# Patient Record
Sex: FEMALE | Race: WHITE | Employment: UNEMPLOYED
[De-identification: names, ages, dates, MRNs, and addresses within clinical notes are randomized per-mention and may not be internally consistent; named-entity substitution may affect disease eponyms.]

---

## 2017-05-27 ENCOUNTER — HEALTH MAINTENANCE LETTER (OUTPATIENT)
Age: 27
End: 2017-05-27

## 2019-09-28 ENCOUNTER — HEALTH MAINTENANCE LETTER (OUTPATIENT)
Age: 29
End: 2019-09-28

## 2021-01-10 ENCOUNTER — HEALTH MAINTENANCE LETTER (OUTPATIENT)
Age: 31
End: 2021-01-10

## 2021-10-23 ENCOUNTER — HEALTH MAINTENANCE LETTER (OUTPATIENT)
Age: 31
End: 2021-10-23

## 2022-02-12 ENCOUNTER — HEALTH MAINTENANCE LETTER (OUTPATIENT)
Age: 32
End: 2022-02-12

## 2022-10-10 ENCOUNTER — HEALTH MAINTENANCE LETTER (OUTPATIENT)
Age: 32
End: 2022-10-10

## 2023-02-18 ENCOUNTER — HEALTH MAINTENANCE LETTER (OUTPATIENT)
Age: 33
End: 2023-02-18

## 2024-03-16 ENCOUNTER — HEALTH MAINTENANCE LETTER (OUTPATIENT)
Age: 34
End: 2024-03-16

## 2024-11-19 ENCOUNTER — APPOINTMENT (OUTPATIENT)
Dept: URBAN - METROPOLITAN AREA CLINIC 256 | Age: 34
Setting detail: DERMATOLOGY
End: 2024-11-19

## 2024-11-19 VITALS — WEIGHT: 190 LBS | HEIGHT: 70 IN

## 2024-11-19 DIAGNOSIS — L71.0 PERIORAL DERMATITIS: ICD-10-CM

## 2024-11-19 DIAGNOSIS — D49.2 NEOPLASM OF UNSPECIFIED BEHAVIOR OF BONE, SOFT TISSUE, AND SKIN: ICD-10-CM

## 2024-11-19 DIAGNOSIS — L57.8 OTHER SKIN CHANGES DUE TO CHRONIC EXPOSURE TO NONIONIZING RADIATION: ICD-10-CM

## 2024-11-19 DIAGNOSIS — D22 MELANOCYTIC NEVI: ICD-10-CM

## 2024-11-19 DIAGNOSIS — Z71.89 OTHER SPECIFIED COUNSELING: ICD-10-CM

## 2024-11-19 PROBLEM — D22.5 MELANOCYTIC NEVI OF TRUNK: Status: ACTIVE | Noted: 2024-11-19

## 2024-11-19 PROCEDURE — OTHER MIPS QUALITY: OTHER

## 2024-11-19 PROCEDURE — OTHER PATIENT SPECIFIC COUNSELING: OTHER

## 2024-11-19 PROCEDURE — OTHER BIOPSY BY SHAVE METHOD: OTHER

## 2024-11-19 PROCEDURE — OTHER PRESCRIPTION: OTHER

## 2024-11-19 PROCEDURE — 99204 OFFICE O/P NEW MOD 45 MIN: CPT | Mod: 25

## 2024-11-19 PROCEDURE — OTHER PHOTO-DOCUMENTATION: OTHER

## 2024-11-19 PROCEDURE — OTHER COUNSELING: OTHER

## 2024-11-19 PROCEDURE — 11102 TANGNTL BX SKIN SINGLE LES: CPT

## 2024-11-19 PROCEDURE — OTHER SUNSCREEN RECOMMENDATIONS: OTHER

## 2024-11-19 PROCEDURE — OTHER PRESCRIPTION MEDICATION MANAGEMENT: OTHER

## 2024-11-19 RX ORDER — MINOCYCLINE HYDROCHLORIDE 100 MG/1
CAPSULE ORAL QD
Qty: 30 | Refills: 0 | Status: ERX | COMMUNITY
Start: 2024-11-19

## 2024-11-19 ASSESSMENT — LOCATION SIMPLE DESCRIPTION DERM
LOCATION SIMPLE: ABDOMEN
LOCATION SIMPLE: UPPER BACK

## 2024-11-19 ASSESSMENT — LOCATION ZONE DERM: LOCATION ZONE: TRUNK

## 2024-11-19 ASSESSMENT — LOCATION DETAILED DESCRIPTION DERM
LOCATION DETAILED: SUPERIOR THORACIC SPINE
LOCATION DETAILED: LEFT LATERAL ABDOMEN

## 2024-11-19 NOTE — PROCEDURE: PRESCRIPTION MEDICATION MANAGEMENT
Render In Strict Bullet Format?: No
Plan: RTC in 1 month to recheck symptoms, sooner with concerns.
Detail Level: Zone
Initiate Treatment: Take one minocycline 100 mg capsule twice daily by mouth with food for 1 month.

## 2024-11-19 NOTE — HPI: FULL BODY SKIN EXAMINATION
What Is The Reason For Today's Visit?: Full Body Skin Examination
What Is The Reason For Today's Visit? (Being Monitored For X): the development of new lesions
Additional History: Left abdomen, tender, raised, discolored, 4 months

## 2024-11-19 NOTE — PROCEDURE: PATIENT SPECIFIC COUNSELING
Detail Level: Zone
- Informed patient symptoms are consistent with Perioral dermatitis \\n- Recommended increase in minocycline dosage instead of lower dosage prescribed by PCP; risks and benefits reviewed\\n- Patient decided to proceed with 100mg minocycline as treatment\\n- Advised patient to RTC in 1 month to recheck symptoms, sooner with concerns\\n- Patient expressed understanding and was agreeable

## 2024-11-19 NOTE — PROCEDURE: BIOPSY BY SHAVE METHOD
Detail Level: Detailed
Depth Of Biopsy: dermis
Was A Bandage Applied: Yes
Size Of Lesion In Cm: 0.8
X Size Of Lesion In Cm: 0
Biopsy Type: H and E
Biopsy Method: Personna blade
Anesthesia Type: 1% Xylocaine with 1:200,000 epinephrine and sodium bicarbonate
Anesthesia Volume In Cc: 0.5
Hemostasis: Drysol
Wound Care: Petrolatum
Dressing: bandage
Destruction After The Procedure: No
Type Of Destruction Used: Curettage
Curettage Text: The wound bed was treated with curettage after the biopsy was performed.
Cryotherapy Text: The wound bed was treated with cryotherapy after the biopsy was performed.
Electrodesiccation Text: The wound bed was treated with electrodesiccation after the biopsy was performed.
Electrodesiccation And Curettage Text: The wound bed was treated with electrodesiccation and curettage after the biopsy was performed.
Silver Nitrate Text: The wound bed was treated with silver nitrate after the biopsy was performed.
Lab: -[id number]
Path Notes (To The Dermatopathologist): Please check margins.
Consent: Written consent was obtained and risks were reviewed including but not limited to scarring, infection, bleeding, scabbing, incomplete removal, nerve damage and allergy to anesthesia.
Post-Care Instructions: I reviewed with the patient in detail post-care instructions. Patient is to keep the biopsy site dry overnight, and then apply bacitracin twice daily until healed.
Notification Instructions: Patient will be notified of biopsy results. However, patient instructed to call the office if not contacted within 2 weeks.
Billing Type: Third-Party Bill
Information: Selecting Yes will display possible errors in your note based on the variables you have selected. This validation is only offered as a suggestion for you. PLEASE NOTE THAT THE VALIDATION TEXT WILL BE REMOVED WHEN YOU FINALIZE YOUR NOTE. IF YOU WANT TO FAX A PRELIMINARY NOTE YOU WILL NEED TO TOGGLE THIS TO 'NO' IF YOU DO NOT WANT IT IN YOUR FAXED NOTE.

## 2024-12-09 ENCOUNTER — APPOINTMENT (OUTPATIENT)
Dept: URBAN - METROPOLITAN AREA CLINIC 256 | Age: 34
Setting detail: DERMATOLOGY
End: 2024-12-09

## 2024-12-09 VITALS — WEIGHT: 200 LBS | HEIGHT: 70 IN

## 2024-12-09 DIAGNOSIS — D22 MELANOCYTIC NEVI: ICD-10-CM

## 2024-12-09 PROBLEM — D22.5 MELANOCYTIC NEVI OF TRUNK: Status: ACTIVE | Noted: 2024-12-09

## 2024-12-09 PROCEDURE — 11402 EXC TR-EXT B9+MARG 1.1-2 CM: CPT

## 2024-12-09 PROCEDURE — OTHER COUNSELING: OTHER

## 2024-12-09 PROCEDURE — OTHER MIPS QUALITY: OTHER

## 2024-12-09 PROCEDURE — OTHER EXCISION: OTHER

## 2024-12-09 PROCEDURE — 12032 INTMD RPR S/A/T/EXT 2.6-7.5: CPT

## 2024-12-09 ASSESSMENT — LOCATION ZONE DERM: LOCATION ZONE: TRUNK

## 2024-12-09 ASSESSMENT — LOCATION SIMPLE DESCRIPTION DERM: LOCATION SIMPLE: ABDOMEN

## 2024-12-09 ASSESSMENT — LOCATION DETAILED DESCRIPTION DERM: LOCATION DETAILED: PERIUMBILICAL SKIN

## 2024-12-09 NOTE — PROCEDURE: EXCISION

## 2024-12-16 ENCOUNTER — RX ONLY (RX ONLY)
Age: 34
End: 2024-12-16

## 2024-12-16 RX ORDER — MINOCYCLINE HYDROCHLORIDE 100 MG/1
CAPSULE ORAL QD
Qty: 60 | Refills: 0 | Status: ERX

## 2025-01-23 ENCOUNTER — TELEPHONE (OUTPATIENT)
Dept: FAMILY MEDICINE | Facility: CLINIC | Age: 35
End: 2025-01-23
Payer: COMMERCIAL

## 2025-01-23 NOTE — TELEPHONE ENCOUNTER
"[facility] phone call message- general phone call:    Reason for call: Pt request injection teaching\" visit:  would you please call to schedule  nurse visit    Action Desired:     Return call needed: Yes    OK to leave a message on voice mail? Yes    Advised patient response may take up to 2 business days: Yes    Primary language: English      needed? No    Call taken on January 23, 2025 at 12:40 PM by JOSE CARLOS Crabtree    Route to [facility] TRIAGE    "

## 2025-01-24 NOTE — TELEPHONE ENCOUNTER
Patient has been injecting for quite awhile, they are having issues with insurance and prior authorization at this time, will discuss the need for injection teaching with patient and provider    Carolyn Resendiz RN

## 2025-01-27 ENCOUNTER — TELEPHONE (OUTPATIENT)
Dept: FAMILY MEDICINE | Facility: CLINIC | Age: 35
End: 2025-01-27

## 2025-01-27 ENCOUNTER — OFFICE VISIT (OUTPATIENT)
Dept: FAMILY MEDICINE | Facility: CLINIC | Age: 35
End: 2025-01-27
Payer: COMMERCIAL

## 2025-01-27 VITALS
DIASTOLIC BLOOD PRESSURE: 78 MMHG | HEART RATE: 60 BPM | SYSTOLIC BLOOD PRESSURE: 121 MMHG | BODY MASS INDEX: 27.62 KG/M2 | HEIGHT: 69 IN | OXYGEN SATURATION: 98 % | TEMPERATURE: 98.6 F | RESPIRATION RATE: 16 BRPM

## 2025-01-27 DIAGNOSIS — F31.81 BIPOLAR 2 DISORDER (H): ICD-10-CM

## 2025-01-27 DIAGNOSIS — Z11.4 SCREENING FOR HIV (HUMAN IMMUNODEFICIENCY VIRUS): Primary | ICD-10-CM

## 2025-01-27 DIAGNOSIS — F64.9 GENDER DYSPHORIA: Primary | ICD-10-CM

## 2025-01-27 DIAGNOSIS — Z12.4 CERVICAL CANCER SCREENING: ICD-10-CM

## 2025-01-27 DIAGNOSIS — K21.9 GASTROESOPHAGEAL REFLUX DISEASE WITHOUT ESOPHAGITIS: ICD-10-CM

## 2025-01-27 DIAGNOSIS — F41.9 ANXIETY: ICD-10-CM

## 2025-01-27 DIAGNOSIS — L71.0 PERIORAL DERMATITIS: ICD-10-CM

## 2025-01-27 DIAGNOSIS — G43.009 MIGRAINE WITHOUT AURA AND WITHOUT STATUS MIGRAINOSUS, NOT INTRACTABLE: ICD-10-CM

## 2025-01-27 DIAGNOSIS — F64.9 GENDER DYSPHORIA: ICD-10-CM

## 2025-01-27 DIAGNOSIS — Z11.59 NEED FOR HEPATITIS C SCREENING TEST: ICD-10-CM

## 2025-01-27 LAB — ESTRADIOL SERPL-MCNC: 597 PG/ML

## 2025-01-27 PROCEDURE — 36415 COLL VENOUS BLD VENIPUNCTURE: CPT | Performed by: FAMILY MEDICINE

## 2025-01-27 PROCEDURE — 82670 ASSAY OF TOTAL ESTRADIOL: CPT | Mod: KX | Performed by: FAMILY MEDICINE

## 2025-01-27 PROCEDURE — 99214 OFFICE O/P EST MOD 30 MIN: CPT | Performed by: FAMILY MEDICINE

## 2025-01-27 PROCEDURE — 84403 ASSAY OF TOTAL TESTOSTERONE: CPT | Mod: KX | Performed by: FAMILY MEDICINE

## 2025-01-27 RX ORDER — PROPRANOLOL HYDROCHLORIDE 10 MG/1
1 TABLET ORAL DAILY PRN
COMMUNITY

## 2025-01-27 RX ORDER — ESTRADIOL VALERATE 20 MG/ML
INJECTION INTRAMUSCULAR
Qty: 15 ML | Refills: 1 | Status: SHIPPED | OUTPATIENT
Start: 2025-01-27

## 2025-01-27 RX ORDER — SUMATRIPTAN 50 MG/1
50 TABLET, FILM COATED ORAL
Qty: 12 TABLET | Refills: 1 | Status: SHIPPED | OUTPATIENT
Start: 2025-01-27

## 2025-01-27 RX ORDER — ESTRADIOL VALERATE 20 MG/ML
INJECTION INTRAMUSCULAR WEEKLY
Qty: 15 ML | Refills: 1 | Status: SHIPPED | OUTPATIENT
Start: 2025-01-27 | End: 2025-01-27

## 2025-01-27 RX ORDER — MINOCYCLINE HYDROCHLORIDE 100 MG/1
100 CAPSULE ORAL 2 TIMES DAILY
Qty: 180 CAPSULE | Refills: 1 | Status: SHIPPED | OUTPATIENT
Start: 2025-01-27

## 2025-01-27 RX ORDER — LAMOTRIGINE 300 MG/1
300 TABLET, EXTENDED RELEASE ORAL DAILY
Qty: 90 TABLET | Refills: 1 | Status: SHIPPED | OUTPATIENT
Start: 2025-01-27

## 2025-01-27 RX ORDER — SPIRONOLACTONE 25 MG/1
25 TABLET ORAL 3 TIMES DAILY
Qty: 270 TABLET | Refills: 1 | Status: SHIPPED | OUTPATIENT
Start: 2025-01-27

## 2025-01-27 RX ORDER — BUSPIRONE HYDROCHLORIDE 10 MG/1
10 TABLET ORAL 2 TIMES DAILY
Qty: 180 TABLET | Refills: 1 | Status: SHIPPED | OUTPATIENT
Start: 2025-01-27

## 2025-01-27 NOTE — TELEPHONE ENCOUNTER
Prior Authorization Specialty Medication Request    Medication/Dose: estradiol valerate (DELESTROGEN) 20 MG/ML injection   Diagnosis and ICD code (if different than what is on RX):    New/renewal/insurance change PA/secondary ins. PA:  Previously Tried and Failed:      Important Lab Values:   Rationale:     Insurance   Primary: [employer]   Insurance ID:  [id number]     Secondary (if applicable):  Insurance ID:      Pharmacy Information (if different than what is on RX)  Name:  [facility]  Phone:    Fax:    Clinic Information  Preferred routing pool for dept communication: p [facility] nurse triage    Carolyn Resendiz RN

## 2025-01-27 NOTE — PROGRESS NOTES
{PROVIDER CHARTING PREFERENCE:758212}    Subjective   Latasha Slaughter is a 34 year old, presenting for the following health issues:  No chief complaint on file.  {(!) Visit Details have not yet been documented.  Please enter Visit Details and then use this list to pull in documentation. (Optional):230374}  HPI     Last injection day was Wednesday 1/22    {ROS Picklists (Optional):714077}      Objective    LMP  (LMP Unknown)   There is no height or weight on file to calculate BMI.  Physical Exam   {Exam List (Optional):064553}    {Diagnostic Test Results (Optional):749382}        Signed Electronically by: Liz Bennett DO  {Email feedback regarding this note to [email or web address]   :572572}   wheezes  CV: regular rate and rhythm, normal S1 S2, no S3 or S4, no murmur, click or rub, no peripheral edema  MS: no gross musculoskeletal defects noted, no edema        Signed Electronically by: Liz Bennett,

## 2025-01-28 NOTE — TELEPHONE ENCOUNTER
"Patient has a \"needle phobia\" her partner is the one who does the injection.    Carolyn Resendiz RN    "

## 2025-01-30 LAB — TESTOST SERPL-MCNC: 14 NG/DL (ref 8–60)

## 2025-01-30 NOTE — TELEPHONE ENCOUNTER
Central Prior Authorization Team  Phone: [phone]    PA Initiation    Medication: ESTRADIOL VALERATE 20 MG/ML IM OIL  Insurance Company: [employer] - Phone [phone] Fax [phone]  Pharmacy Filling the Rx: [id number] - [facility] [address] [place] [address]  Filling Pharmacy Phone: [phone]  Filling Pharmacy Fax: [phone]  Start Date: 1/30/2025

## 2025-01-30 NOTE — TELEPHONE ENCOUNTER
Received a phone call from Gregorio at [facility] stated that PA has been approved from 1/30/25- 1/29/26. However, pharmacy is still not getting a paid claim due to qty limit exceeding. Left a message for Leeanne at [facility] to look into rejection and call back.

## 2025-02-03 ENCOUNTER — TELEPHONE (OUTPATIENT)
Dept: FAMILY MEDICINE | Facility: CLINIC | Age: 35
End: 2025-02-03
Payer: COMMERCIAL

## 2025-02-03 NOTE — TELEPHONE ENCOUNTER
Leeanne at [facility] called back and left a message on 1/31 stating that pharmacy needs to process claim for a qty of 5mL per 30 days (a 28 day supply).  However, per notes below pt no longer has coverage through [facility]. If pt has a new insurance plan starting this month, then will need to submit a new PA to the current insurance plan.

## 2025-02-03 NOTE — TELEPHONE ENCOUNTER
Barber can you please reach out to this patient and help them with their insurance.    They had [place] [employer] but say they were dropped without notice as it was linked to their partner.    Thank you  Carolyn Resendiz RN

## 2025-02-03 NOTE — TELEPHONE ENCOUNTER
Spoke with patient, apparently [place] [employer] dropped the patient because it was linked to their partner.    Patient is now without insurance, and would like help form SW to get some insurance.    Sending a separate message to MIRTA Pimentel RN

## 2025-02-03 NOTE — TELEPHONE ENCOUNTER
Called pharmacy to check on status, they say they do not have [employer] as patient's insurance, they have [facility] for her insurance.    Called patient and left a message to call back.    Carolyn Resendiz RN

## 2025-02-04 NOTE — TELEPHONE ENCOUNTER
called patient this morning to follow up on insurance needs, as patient's insurance is showing an inactive status.    After answering the phone, patient asked SW to call back at a later time.    JOSE CARLOS Pimentel

## 2025-02-06 ENCOUNTER — TELEPHONE (OUTPATIENT)
Dept: FAMILY MEDICINE | Facility: CLINIC | Age: 35
End: 2025-02-06

## 2025-02-06 NOTE — TELEPHONE ENCOUNTER
[facility] phone call message- medication clarification/question:    Full Medication Name: LamoTRIgine (LAMICTAL XR) 300 MG 24 hr tablet        Question: The patient would like a call back to discuss a cheaper alternative to this medication.     Pharmacy confirmed as [id number] - [facility] [address]    : Yes    OK to leave a message on voice mail? Yes    Primary language: English      needed? No    Call taken on February 6, 2025 at 10:26 AM by Celia Lopez

## 2025-02-07 NOTE — TELEPHONE ENCOUNTER
called patient this morning to follow up on insurance.    Voicemail was left asking for a call back when able.    JOSE CARLOS Pimentel

## 2025-02-18 NOTE — TELEPHONE ENCOUNTER
FUTURE VISIT INFORMATION      FUTURE VISIT INFORMATION:  Date: 3/24/25  Time: 11:00am  Location: [facility]  REFERRAL INFORMATION:  Reason for visit/diagnosis  orchiectomy     RECORDS REQUESTED FROM:       No recs to collect

## 2025-02-24 ENCOUNTER — TRANSFERRED RECORDS (OUTPATIENT)
Dept: HEALTH INFORMATION MANAGEMENT | Facility: CLINIC | Age: 35
End: 2025-02-24

## 2025-03-04 ENCOUNTER — TELEPHONE (OUTPATIENT)
Dept: FAMILY MEDICINE | Facility: CLINIC | Age: 35
End: 2025-03-04
Payer: COMMERCIAL

## 2025-03-04 NOTE — TELEPHONE ENCOUNTER
[facility] phone call message- general phone call:    Reason for call: Pt called stating that they are having continuous pain in an intramuscular vaccine site in their right hip. Pt requesting a call back for advice    Return call needed: Yes    OK to leave a message on voice mail? Yes    Primary language: English      needed? No    Call taken on March 4, 2025 at 12:41 PM by Latasha Juarez

## 2025-03-05 ENCOUNTER — TELEPHONE (OUTPATIENT)
Dept: FAMILY MEDICINE | Facility: CLINIC | Age: 35
End: 2025-03-05
Payer: COMMERCIAL

## 2025-03-05 NOTE — TELEPHONE ENCOUNTER
Prior Authorization Specialty Medication Request    Medication/Dose: lurasidone (LATUDA) 80 MG TABS tablet  Diagnosis and ICD code (if different than what is on RX):    New/renewal/insurance change PA/secondary ins. PA:  Previously Tried and Failed:      Important Lab Values:   Rationale:     Insurance   Primary: [employer]   Insurance ID:  [id number]     Secondary (if applicable):  Insurance ID:      Pharmacy Information (if different than what is on RX)  Name:    Phone:    Fax:    Clinic Information  Preferred routing pool for dept communication:

## 2025-03-08 NOTE — TELEPHONE ENCOUNTER
Prior Authorization Not Needed per Insurance    Medication: LURASIDONE HCL 40 MG PO TABS  Insurance Company: [employer] - Phone [phone] Fax [phone]  Expected CoPay: $    Pharmacy Filling the Rx: [id number] - [facility] [address] [facility]  Pharmacy Notified: They filled 2/26 $ copay  Patient Notified: no      Thank you,    Lm Good  Oncology Pharmacy Liaison II  [email or web address]  Phone: [phone]  Fax: [phone]

## 2025-03-18 ENCOUNTER — MEDICAL CORRESPONDENCE (OUTPATIENT)
Dept: HEALTH INFORMATION MANAGEMENT | Facility: CLINIC | Age: 35
End: 2025-03-18
Payer: COMMERCIAL

## 2025-03-19 ENCOUNTER — TELEPHONE (OUTPATIENT)
Dept: FAMILY MEDICINE | Facility: CLINIC | Age: 35
End: 2025-03-19
Payer: COMMERCIAL

## 2025-03-19 NOTE — TELEPHONE ENCOUNTER
[facility] phone call message- medication clarification/question:    Full Medication Name: estradiol valerate (DELESTROGEN) 20 MG/ML injection     Question: The patient would like a call back to discuss the dosing on this medication. The patient's insurance will not pay for the current dosing.     Pharmacy confirmed as     [id number] - [place], [place] - [address]    : Yes    OK to leave a message on voice mail? Yes    Primary language: English      needed? No    Call taken on March 19, 2025 at 12:44 PM by Celia Lopez

## 2025-03-20 NOTE — TELEPHONE ENCOUNTER
Maritza, pharmacist for [facility] called back, she spoke with insurance and their issue is that the script says discard every 28 days. They will only fill a 30 day supply.    I let her know she can go ahead and process as 30 day supply but patient will be instructed to discard at 28 days.    Called patient and she expressed understanding.    Carolyn Resendiz RN

## 2025-03-20 NOTE — TELEPHONE ENCOUNTER
Called and spoke with pharmacist, she does not know why the insurance is saying that they need a 1 month quantity. She will contact the insurance and get back to me if the script needs to be changed.    Carolyn Resendiz RN

## 2025-03-20 NOTE — TELEPHONE ENCOUNTER
"Called patient to discuss issue. They have been paying out of pocket for their estradiol because insurance is saying the dose the patient is taking, and the way the script is written is incorrect.    They want a script for 1 month, the 5ml vial is too much as it is \"not reusable\".    I will call [facility] when they open to discuss exact issue that they are seeing.    Carolyn Resendiz RN    "

## 2025-03-22 ENCOUNTER — HEALTH MAINTENANCE LETTER (OUTPATIENT)
Age: 35
End: 2025-03-22

## 2025-03-24 ENCOUNTER — PRE VISIT (OUTPATIENT)
Dept: PLASTIC SURGERY | Facility: CLINIC | Age: 35
End: 2025-03-24

## 2025-03-24 ENCOUNTER — OFFICE VISIT (OUTPATIENT)
Dept: PLASTIC SURGERY | Facility: CLINIC | Age: 35
End: 2025-03-24
Payer: COMMERCIAL

## 2025-03-24 VITALS
DIASTOLIC BLOOD PRESSURE: 81 MMHG | HEART RATE: 75 BPM | BODY MASS INDEX: 33.22 KG/M2 | WEIGHT: 224.3 LBS | SYSTOLIC BLOOD PRESSURE: 144 MMHG | OXYGEN SATURATION: 98 % | HEIGHT: 69 IN

## 2025-03-24 DIAGNOSIS — F64.9 GENDER DYSPHORIA: Primary | ICD-10-CM

## 2025-03-24 PROBLEM — F32.A DEPRESSION: Status: ACTIVE | Noted: 2025-03-24

## 2025-03-24 PROBLEM — R09.A2 GLOBUS SENSATION: Status: ACTIVE | Noted: 2023-08-25

## 2025-03-24 ASSESSMENT — PAIN SCALES - GENERAL: PAINLEVEL_OUTOF10: NO PAIN (0)

## 2025-03-24 NOTE — LETTER
3/24/2025       RE: Latasha Slaughter  [address]   [address]  [place] [address]     Dear Colleague,    Thank you for referring your patient, Latasha Slaughter, to the [facility] [place] [facility]. Please see a copy of my visit note below.        Name: Latasha Slaughter     MRN: [id number]   YOB: 1990                 Chief Complaint:   Gender Dysphoria            History of Present Illness:   Latasha is a 34 year old transgender female seen in consultation for gender dysphoria    Patient transitioned more than 12 years ago  Preferred pronouns are: she/her/hers  The patient has been on exogenous hormones since: 12+ years ago.  In terms of an intimate relationship and support system, the patient lives with her partner. She also lives about 5 minutes away from her grandparents who are supportive.  In terms of fertility, the patient: has no interest in sperm banking    (New)  The patient has obtained a letter of support from her therapist. She will get second letter from her HRT provider.     (Prior Surgery)  The patient has previously undergone no gender surgeries.     Long-term surgical goals for the patient include: orchiectomy with scrotectomy. She is considering top surgery in the future, but no interest in vaginoplasty.    The patient is here today expressing interest in orchiectomy with scrotectomy.           Past Medical History:     Past Medical History:   Diagnosis Date     ADHD (attention deficit hyperactivity disorder)      Allergic rhinitis, cause unspecified      Bipolar 2 disorder (H)      Gastroesophageal reflux disease without esophagitis      Headache(784.0)      Hormone replacement therapy      OCD (obsessive compulsive disorder)      Unspecified adjustment reaction    Gender dysphoria  PTSD  Anxiety  depression         Past Surgical History:     Past Surgical History:   Procedure  Laterality Date     HERNIA REPAIR  as an infant     mole removed  12/2024    precancerous, clear margins            Social History:     Social History     Tobacco Use     Smoking status: Never     Smokeless tobacco: Never   Substance Use Topics     Alcohol use: No   No nicotine or tobacco use         Family History:     Family History   Problem Relation Age of Onset     Alcohol/Drug Father         recovered     Depression Mother         anxiety     Depression Father         anxiety     Depression Sister         social anxiety     Depression Maternal Grandmother      Depression Paternal Grandfather      Alcohol/Drug Paternal Grandfather      C.A.D. Paternal Grandmother      Diabetes Paternal Grandmother      Obesity Paternal Grandmother      Obesity Paternal Grandfather      Cancer No family hx of               Allergies:     Allergies   Allergen Reactions     Amoxicillin      Azithromycin Hives     Biaxin [Clarithromycin]      Erythromycin             Medications:     Current Outpatient Medications   Medication Sig Dispense Refill     busPIRone (BUSPAR) 10 MG tablet Take 1 tablet (10 mg) by mouth 2 times daily. 180 tablet 1     cetirizine (ZYRTEC) 10 MG tablet Take 10 mg by mouth daily.       estradiol valerate (DELESTROGEN) 20 MG/ML injection Inject 0.38 mLs (7.6 mg) into the muscle once a week. Discard vial after 28 days after puncture 5 mL 3     estradiol valerate (DELESTROGEN) 20 MG/ML injection Inject 0.5mL into the muscle once a week. 15 mL 1     LamoTRIgine (LAMICTAL XR) 300 MG 24 hr tablet Take 1 tablet (300 mg) by mouth daily. 90 tablet 1     lurasidone (LATUDA) 80 MG TABS tablet Take half a tablet (40mg) one time daily with food 90 tablet 3     minocycline (MINOCIN) 100 MG capsule Take 1 capsule (100 mg) by mouth 2 times daily. 180 capsule 1     omeprazole (PRILOSEC) 20 MG DR capsule Take 1 capsule (20 mg) by mouth daily. 90 capsule 3     progesterone (PROMETRIUM) 100 MG capsule Take 1 capsule (100 mg) by  "mouth daily. 30 capsule 3     propranolol (INDERAL) 10 MG tablet Take 1 tablet by mouth daily as needed.       spironolactone (ALDACTONE) 25 MG tablet Take 2 tablets (50 mg) by mouth daily. 270 tablet 1     SUMAtriptan (IMITREX) 50 MG tablet Take 1 tablet (50 mg) by mouth at onset of headache for migraine. May repeat in 2 hours. Max 4 tablets/24 hours. 12 tablet 1     No current facility-administered medications for this visit.             Review of Systems:    ROS: ROS neg other than the symptoms noted above in the HPI.          Physical Exam:   BP (!) 144/81 (BP Location: Left arm, Patient Position: Sitting, Cuff Size: Adult Large)   Pulse 75   Ht 1.753 m (5' 9\")   Wt 101.7 kg (224 lb 4.8 oz)   SpO2 98%   BMI 33.12 kg/m    General: age-appropriate in NAD  HEENT: Head AT/NC, EOMI, CN Grossly intact  Resp: no respiratory distress  : Bilateral testicles present  Neuro: grossly intact      Outside records:    I spent 10 minutes reviewing outside records.         Assessment and Plan:   34 year old transgender female with gender dysphoria    The criteria for genital surgery are specific to the type of surgery being requested.  Criteria for bottom surgery:    1. Persistent, well documented gender dysphoria;  2. Capacity to make a fully informed decision and to consent for treatment;  3. Age of consent (>18 years old)  4. If significant medical or mental health concerns are present, they must be well controlled.  5. 6-12 continuous months of hormone therapy as appropriate to the patient s gender goals (unless  the patient has a medical contraindication or is otherwise unable or unwilling to take  Hormones).  6. One or two letters of support (depending on insurance provider)    The aim of hormone therapy prior to gonadectomy is primarily to introduce a period of reversible  estrogen or testosterone suppression, before the patient undergoes irreversible surgical intervention.    I reviewed the steps of orchiectomy " with scrotectomy. I reviewed the surgical procedure. I reviewed the risks and benefits including bleeding, infection and irreversible nature of the procedure. I reviewed that she will discharge with surgical drain which will be removed 5-7 days after surgery. The patient would like orchiectomy with removal of scrotal skin.    She has a persistent, well documented gender dysphoria. She has capacity to make a fully informed decision and to consent for treatment. Her mental health issues are well controlled. She has been on continuous hormones for years. She has one and will obtain a second letter of support.     The patient meets all of these criteria. We discussed that gender affirmation surgery should be considered permanent.     We discussed the option for surgery. She would like orchiectomy with scrotectomy      This would require no hospital stay      Patient needs second LOS and DA before prior auth    Plan: Patient will obtain and submit LOS and DA, then ready for PA for orchiectomy with scrotectomy with either Dr Pinedo or TOD Buckley, CNP  [facility]    15 minutes spent on day of encounter doing chart review, history and exam, consultation and education, and additional activities as including in note above.          Again, thank you for allowing me to participate in the care of your patient.      Sincerely,    TOD Elias CNP

## 2025-03-24 NOTE — NURSING NOTE
"Chief Complaint   Patient presents with    Consult     Orchiectomy.       Vitals:    03/24/25 1050   BP: (!) 144/81   BP Location: Left arm   Patient Position: Sitting   Cuff Size: Adult Large   Pulse: 75   SpO2: 98%   Weight: 224 lb 4.8 oz   Height: 5' 9\"       Body mass index is 33.12 kg/m .        Feliciano Rios, EMT    "

## 2025-03-24 NOTE — PROGRESS NOTES
Name: Latasha Slaughter     MRN: [id number]   YOB: 1990                 Chief Complaint:   Gender Dysphoria            History of Present Illness:   Latasha is a 34 year old transgender female seen in consultation for gender dysphoria    Patient transitioned more than 12 years ago  Preferred pronouns are: she/her/hers  The patient has been on exogenous hormones since: 12+ years ago.  In terms of an intimate relationship and support system, the patient lives with her partner. She also lives about 5 minutes away from her grandparents who are supportive.  In terms of fertility, the patient: has no interest in sperm banking    (New)  The patient has obtained a letter of support from her therapist. She will get second letter from her HRT provider.     (Prior Surgery)  The patient has previously undergone no gender surgeries.     Long-term surgical goals for the patient include: orchiectomy with scrotectomy. She is considering top surgery in the future, but no interest in vaginoplasty.    The patient is here today expressing interest in orchiectomy with scrotectomy.           Past Medical History:     Past Medical History:   Diagnosis Date    ADHD (attention deficit hyperactivity disorder)     Allergic rhinitis, cause unspecified     Bipolar 2 disorder (H)     Gastroesophageal reflux disease without esophagitis     Headache(784.0)     Hormone replacement therapy     OCD (obsessive compulsive disorder)     Unspecified adjustment reaction    Gender dysphoria  PTSD  Anxiety  depression         Past Surgical History:     Past Surgical History:   Procedure Laterality Date    HERNIA REPAIR  as an infant    mole removed  12/2024    precancerous, clear margins            Social History:     Social History     Tobacco Use    Smoking status: Never    Smokeless tobacco: Never   Substance Use Topics    Alcohol use: No   No nicotine or tobacco use         Family History:     Family History   Problem Relation Age of  Onset    Alcohol/Drug Father         recovered    Depression Mother         anxiety    Depression Father         anxiety    Depression Sister         social anxiety    Depression Maternal Grandmother     Depression Paternal Grandfather     Alcohol/Drug Paternal Grandfather     C.A.D. Paternal Grandmother     Diabetes Paternal Grandmother     Obesity Paternal Grandmother     Obesity Paternal Grandfather     Cancer No family hx of               Allergies:     Allergies   Allergen Reactions    Amoxicillin     Azithromycin Hives    Biaxin [Clarithromycin]     Erythromycin             Medications:     Current Outpatient Medications   Medication Sig Dispense Refill    busPIRone (BUSPAR) 10 MG tablet Take 1 tablet (10 mg) by mouth 2 times daily. 180 tablet 1    cetirizine (ZYRTEC) 10 MG tablet Take 10 mg by mouth daily.      estradiol valerate (DELESTROGEN) 20 MG/ML injection Inject 0.38 mLs (7.6 mg) into the muscle once a week. Discard vial after 28 days after puncture 5 mL 3    estradiol valerate (DELESTROGEN) 20 MG/ML injection Inject 0.5mL into the muscle once a week. 15 mL 1    LamoTRIgine (LAMICTAL XR) 300 MG 24 hr tablet Take 1 tablet (300 mg) by mouth daily. 90 tablet 1    lurasidone (LATUDA) 80 MG TABS tablet Take half a tablet (40mg) one time daily with food 90 tablet 3    minocycline (MINOCIN) 100 MG capsule Take 1 capsule (100 mg) by mouth 2 times daily. 180 capsule 1    omeprazole (PRILOSEC) 20 MG DR capsule Take 1 capsule (20 mg) by mouth daily. 90 capsule 3    progesterone (PROMETRIUM) 100 MG capsule Take 1 capsule (100 mg) by mouth daily. 30 capsule 3    propranolol (INDERAL) 10 MG tablet Take 1 tablet by mouth daily as needed.      spironolactone (ALDACTONE) 25 MG tablet Take 2 tablets (50 mg) by mouth daily. 270 tablet 1    SUMAtriptan (IMITREX) 50 MG tablet Take 1 tablet (50 mg) by mouth at onset of headache for migraine. May repeat in 2 hours. Max 4 tablets/24 hours. 12 tablet 1     No current  "facility-administered medications for this visit.             Review of Systems:    ROS: ROS neg other than the symptoms noted above in the HPI.          Physical Exam:   BP (!) 144/81 (BP Location: Left arm, Patient Position: Sitting, Cuff Size: Adult Large)   Pulse 75   Ht 1.753 m (5' 9\")   Wt 101.7 kg (224 lb 4.8 oz)   SpO2 98%   BMI 33.12 kg/m    General: age-appropriate in NAD  HEENT: Head AT/NC, EOMI, CN Grossly intact  Resp: no respiratory distress  : Bilateral testicles present  Neuro: grossly intact      Outside records:    I spent 10 minutes reviewing outside records.         Assessment and Plan:   34 year old transgender female with gender dysphoria    The criteria for genital surgery are specific to the type of surgery being requested.  Criteria for bottom surgery:    1. Persistent, well documented gender dysphoria;  2. Capacity to make a fully informed decision and to consent for treatment;  3. Age of consent (>18 years old)  4. If significant medical or mental health concerns are present, they must be well controlled.  5. 6-12 continuous months of hormone therapy as appropriate to the patient s gender goals (unless  the patient has a medical contraindication or is otherwise unable or unwilling to take  Hormones).  6. One or two letters of support (depending on insurance provider)    The aim of hormone therapy prior to gonadectomy is primarily to introduce a period of reversible  estrogen or testosterone suppression, before the patient undergoes irreversible surgical intervention.    I reviewed the steps of orchiectomy with scrotectomy. I reviewed the surgical procedure. I reviewed the risks and benefits including bleeding, infection and irreversible nature of the procedure. I reviewed that she will discharge with surgical drain which will be removed 5-7 days after surgery. The patient would like orchiectomy with removal of scrotal skin.    She has a persistent, well documented gender dysphoria. " She has capacity to make a fully informed decision and to consent for treatment. Her mental health issues are well controlled. She has been on continuous hormones for years. She has one and will obtain a second letter of support.     The patient meets all of these criteria. We discussed that gender affirmation surgery should be considered permanent.     We discussed the option for surgery. She would like orchiectomy with scrotectomy      This would require no hospital stay      Patient needs second LOS and DA before prior auth    Plan: Patient will obtain and submit LOS and DA, then ready for PA for orchiectomy with scrotectomy with either Dr Pinedo or TOD Buckley, [facility]    15 minutes spent on day of encounter doing chart review, history and exam, consultation and education, and additional activities as including in note above.

## 2025-03-25 DIAGNOSIS — G43.009 MIGRAINE WITHOUT AURA AND WITHOUT STATUS MIGRAINOSUS, NOT INTRACTABLE: ICD-10-CM

## 2025-03-26 NOTE — TELEPHONE ENCOUNTER
"Request for medication refill:    Medication Name:     SUMAtriptan (IMITREX) 50 MG tablet       Providers if patient needs an appointment and you are willing to give a one month supply please refill for one month and  send a MyChart using \".SMILLIMITEDREFILL\" .Or route chart to \"[facility] \" . To call patient and inform of limited refill and providers request to make an appointment. (Giving one month refill in non controlled medications is strongly recommended before denial)    If refill has been denied, meaning absolutely no refills without visit, please complete the smart phrase \".SMIRXREFUSE\" and route it to the \"[facility] MED REFILLS\"  pool to inform the patient and the pharmacy.    Gomez Kessler MA     "

## 2025-03-27 RX ORDER — SUMATRIPTAN 50 MG/1
50 TABLET, FILM COATED ORAL
Qty: 12 TABLET | Refills: 0 | Status: SHIPPED | OUTPATIENT
Start: 2025-03-27

## 2025-04-09 ENCOUNTER — DOCUMENTATION ONLY (OUTPATIENT)
Dept: PLASTIC SURGERY | Facility: CLINIC | Age: 35
End: 2025-04-09
Payer: COMMERCIAL

## 2025-04-09 NOTE — PROGRESS NOTES
[facility] : LOS Review Note     SITUATION       Destiny (she/her/hers) is a 34 year old adult who is  receiving support for:  Care Team (LOS Review)  .    BACKGROUND     April 9, 2025  Writer received and reviewed clinicals to support PA for gender affirming surgery. Clincal review were based on medical policy set by Other, [facility]   and [employer] SOC v8.    Surgery: Orchiectomy   LOS # 1: meets criteria (media tab 3/20/25)  LOS # 2: requested  DA: requested    Sent message to patient requesting second LOS and DA.      ASSESSMENT      Surgery              CGC Assessment  Comprehensive Gender Care (CGC) Enrollment: Enrolled  Patient has a therapist: Yes  Letter of support #1: Received  Letter #1 Date: 03/18/25  Letter of support #2: Requested  Surgery being considered: Yes  Vaginoplasty: Yes         For prior authorization, submit the following clinicals:    Surgery: Orchiectomy   LOS 1: media tab 3/20/25  LOS 2: pending  DA: pending    PLAN      Social Work Interventions: assessment; care coordination    Follow-up plan:  obtain second LOS; Obtain DA       BEAR Jordan

## 2025-04-10 ENCOUNTER — OFFICE VISIT (OUTPATIENT)
Dept: FAMILY MEDICINE | Facility: CLINIC | Age: 35
End: 2025-04-10
Payer: COMMERCIAL

## 2025-04-10 VITALS
SYSTOLIC BLOOD PRESSURE: 123 MMHG | HEART RATE: 68 BPM | RESPIRATION RATE: 17 BRPM | BODY MASS INDEX: 33.24 KG/M2 | OXYGEN SATURATION: 96 % | HEIGHT: 69 IN | TEMPERATURE: 97.8 F | WEIGHT: 224.4 LBS | DIASTOLIC BLOOD PRESSURE: 84 MMHG

## 2025-04-10 DIAGNOSIS — F64.9 GENDER DYSPHORIA: ICD-10-CM

## 2025-04-10 DIAGNOSIS — F31.81 BIPOLAR 2 DISORDER (H): Primary | ICD-10-CM

## 2025-04-10 DIAGNOSIS — G43.009 MIGRAINE WITHOUT AURA AND WITHOUT STATUS MIGRAINOSUS, NOT INTRACTABLE: ICD-10-CM

## 2025-04-10 DIAGNOSIS — Z11.59 NEED FOR HEPATITIS C SCREENING TEST: ICD-10-CM

## 2025-04-10 LAB
HCV AB SERPL QL IA: NONREACTIVE
HIV 1+2 AB+HIV1 P24 AG SERPL QL IA: NONREACTIVE

## 2025-04-10 PROCEDURE — 86803 HEPATITIS C AB TEST: CPT

## 2025-04-10 PROCEDURE — 36415 COLL VENOUS BLD VENIPUNCTURE: CPT

## 2025-04-10 PROCEDURE — 99214 OFFICE O/P EST MOD 30 MIN: CPT | Mod: GC

## 2025-04-10 PROCEDURE — 87389 HIV-1 AG W/HIV-1&-2 AB AG IA: CPT

## 2025-04-10 RX ORDER — LURASIDONE HYDROCHLORIDE 40 MG/1
40 TABLET, FILM COATED ORAL
Qty: 90 TABLET | Refills: 3 | Status: SHIPPED | OUTPATIENT
Start: 2025-04-10

## 2025-04-10 RX ORDER — TOPIRAMATE 25 MG/1
25 TABLET, FILM COATED ORAL DAILY
Qty: 30 TABLET | Refills: 3 | Status: SHIPPED | OUTPATIENT
Start: 2025-04-10

## 2025-04-10 RX ORDER — TOPIRAMATE 25 MG/1
25 TABLET, FILM COATED ORAL 2 TIMES DAILY
Qty: 30 TABLET | Refills: 3 | Status: SHIPPED | OUTPATIENT
Start: 2025-04-10 | End: 2025-04-10

## 2025-04-10 ASSESSMENT — PATIENT HEALTH QUESTIONNAIRE - PHQ9
SUM OF ALL RESPONSES TO PHQ QUESTIONS 1-9: 10
SUM OF ALL RESPONSES TO PHQ QUESTIONS 1-9: 10
10. IF YOU CHECKED OFF ANY PROBLEMS, HOW DIFFICULT HAVE THESE PROBLEMS MADE IT FOR YOU TO DO YOUR WORK, TAKE CARE OF THINGS AT HOME, OR GET ALONG WITH OTHER PEOPLE: SOMEWHAT DIFFICULT

## 2025-04-10 ASSESSMENT — PAIN SCALES - GENERAL: PAINLEVEL_OUTOF10: NO PAIN (0)

## 2025-04-10 NOTE — LETTER
April 15, 2025      Latasha Slaughter  [address]     [place] [address]    I am writing this letter on behalf of Destiny, legal name Latasha Slaughter,  1990 in order to support the medically necessary and appropriate orchiectomy with scrotectomy.    I have been providing treatment, including a comprehensive diagnostic assessment to Destiny since 2025. Destiny has received gender affirming care from other providers since . This patient has been seen at [facility] since 2025.  In the course of our patient-provider relationship, and by thorough chart review of prior visits with other similarly qualified providers, I have assessed them for readiness, appropriateness, and eligibility for this surgery.    I am a [place]-licensed Family Physician practicing at [facility] in [place], [place].  I have experience in the assessment and treatment of gender dysphoria, incongruence, and diversity. As part of my education, training, and years of experience, I have the knowledge and competence in the diagnosis of gender diverse identities and expressions, as well as in diagnosing possible comorbid disorders such as mood disorders, personality disorders, and substance related disorders. Additionally, I can recognize and diagnose co-existing mental health concerns and distinguish these from gender dysphoria. Finally, I can assess a person's capacity to consent for treatment in accordance with the criteria described in the World Professional Association for Transgender Health (WPATH) Standards of Care 8th Version (SOC8). As part of a multi-disciplinary team and being a Family Medicine Physician, I frequently participate in engagement with other health care professionals from different disciplines within the field of transgender health for consultation. Specifically, I coordinate and consult with gender affirming surgeons, mental health professionals, and social workers.      Comprehensive Biopsychosocial Assessment  Based on our comprehensive evaluation, the above named patient meets the diagnostic criteria for gender dysphoria per the Diagnostic and Statistical Manual of Mental Health Disorders Fifth Edition, text revision (DSM 5-TR). They have demonstrated a marked incongruence between their experienced/expressed gender and assigned gender for at least 6 months as manifested by: a marked incongruence between their experienced/expressed gender and primary and/or secondary sex characteristics; a strong desire to be rid of their primary and/or secondary sex characteristics because of a marked incongruence with their experienced/expressed gender; a strong desire for the primary and/or secondary sex characteristics of another gender; a strong desire to be another gender; and a strong conviction that they have the typical feelings and reactions of another gender. This condition is associated with clinically significant distress or impairment in social and occupational functioning.    Other conditions that may interfere with diagnostic clarity, capacity to consent, and gender-affirming medical treatments have been evaluated and addressed including bipolar 2 disorder, OCD, PTSD. The above named patient's symptoms are stabilized and diagnosis are being addressed with talk therapy and medications. The patient has adequate resilience and capacity to undergo this procedure, and cope with any potential complications.     Destiny was assigned male at birth on 1990. Since more than 12 years ago, Destiny has experienced symptoms related to gender dysphoria. Destiny has been expressing their affirmed gender in their day-to-day life for approximately 12 years including wardrobe, hair, pronouns, and name. Destiny has undergone gender affirming medical interventions including hormone replacement therapy since 2011 at [facility] . Despite this treatment, they continue  "to experience gender dysphoria and emotional distress due to their body not fully aligning with their gender identity. A multidisciplinary team approach was involved during the above named patient's treatment. They have demonstrated the emotional and cognitive maturity required to provide informed consent for the treatment.     Clinical Rationale, Ability to Consent & Eligibility  Informed consent has been obtained from Destiny. Destiny has consented to treatment with orchiectomy with scrotectomy. .    They have demonstrated an understanding of the impact of this surgical procedure(s). They understand that WPATH Standards of Care SOC 8 refer to these surgical treatments as \"irreversible,\" and that reversal of breast/chest and genital surgical treatment are not eligible for coverage prior to providing informed consent for this surgery. Destiny is fully capable of making an informed decision about surgery, and is expected to follow and adhere to pre- and post-surgical treatment recommendations responsibly.  Destiny is emotionally and practically ready for this gender affirmation surgery, provided they are recommended for surgery by the surgeon after consultation.  The patient has acceptable social supports in place and appropriate recovery plan. The patient has reasonable expectations with regards to surgical outcomes, and is prepared for the potential of complications or less-than-satisfactory results.     We have discussed reproductive health and fertility. Destiny understands the effects of the surgery on reproduction and have explored reproductive options. We have discussed that lifelong hormone replacement therapy is required after gonad removal in order to preserve bone health.     The patient is a never-smoker has abstained from nicotine products for at least 6 months prior to the planned procedure.    Recommendation  As Destiny meets all the World Professional Association for Transgender Health (WPATH) Standards of Care (SOCv8) " criteria for gender affirming surgery and Gender Incongruence and Dysphoria (F64.0), it is at this time medically necessary for this patient to undergo orchiectomy with scrotectomy. Surgery and affiliated medical interventions to reduce gender dysphoria are considered medically necessary treatments by the WPATH SOCv8. Gender affirmation surgery will decrease gender dysphoria and psychological distress and improve overall psychological functioning.  Rectifying body incongruence is expected to provide marked relief of the anxiety and distress that they experience. As a result of my assessment, I recommend this surgery as the next appropriate step to allow them to continue living in their true identified gender. Following the WPATH SOC8 recommendations, this letter serves as the single required opinion for gender-affirming surgery. This letter of referral is based on the psychological indication for surgery and did not include a physical examination to assess medical contra-indications unrelated to mental health for the surgical procedure. If you have any questions, or need additional information, please do not hesitate to contact me.         Elham Us MD        [facility]     [employer]  [address] [address]  Phone: [phone]  Fax: [phone]

## 2025-04-10 NOTE — PROGRESS NOTES
Assessment & Plan     Bipolar 2 disorder (H)  Diagnosed over 15 years ago, stable and well-controlled with Lamictal extended release 300 mg daily and BuSpar 10 mg 2 times a day and Latuda 40 mg daily. Has graduated to primary care management.  - lurasidone (LATUDA) 40 MG TABS tablet; Take 1 tablet (40 mg) by mouth daily with food.  - Follow up yearly or with concerns    Gender dysphoria  On gaHRT with estradiol since 2011. Currently on: estradiol valerate 0.38 mLs (7.6 mg) once a week, spironolactone 50 mg daily, progesterone oral 100 mg daily. Tolerating initiation of progesterone 100mg PO well.  Dose of estrogen was recently decreased from 10 mg -> 7.6 mg after supratherapeutic level in January.  Recheck today. Unmet embodiment goals that are not expected to be met with medication; would be better addressed with surgical interventions, which she desires.   - Letter of support written for gender affirming orchiectomy and will be forwarded to surgical team  - Comprehensive Gender Care Referral - Internal; breast augmentation  - Verbal community referral for FFS given via mychart  - Estradiol level  (today is day 6, trough level)    Migraine headaches  Incidentally noted during review of systems today.  Patient is having disabling migraine without aura several days a month.  Not overusing sumatriptan hand.  Discussed risks and benefits of different prophylactic medications today including Topamax and propranolol.  Opted for Topamax, will monitor for side effects of decreased attention and memory.  -Topamax 25 mg daily increase to 50 mg after 1 week if no side effects  -Follow-up in 2 to 4 weeks    Need for hepatitis C screening test  Routine screening  - Hepatitis C Screen Reflex to HCV RNA Quant and Genotype    {FOLLOW UP PLANS (Optional) Includes COVID19 Treatment Plan:730811}    No follow-ups on file.    Subjective   Destiny is a 34 year old, presenting for the following health issues:  RECHECK (HRT)      4/10/2025  "   10:42 AM   Additional Questions   Roomed by Darren   Accompanied by Self         4/10/2025    Information    services provided? No     HPI      MIGRAINES  Headache days a month: 4-5  Sumatriptan takes it from 10/10 pain to 4/10 pain  4-5 headache days per month    GENDER AFFIRMING CARE    Estrogen-based therapy  Duration of hormone therapy: 12 years  Currently on: estradiol valerate 0.38 mLs (7.6 mg) once a week, spironolactone 50 mg daily, progesterone oral 100 mg daily.  No missed doses  Shot day Sunday  Last injection Sunday (today is day 6, trough level)    Progress toward embodiment goals (1 = no change yet, 5 = reached goal)  Breast/chest growth: 3/5   Softening of skin: 5/5  Less facial hair: 1/5  Less body hair: 2/5  Body fat redistribution: 3/5  Decrease in muscle mass: 4/5  Facial changes: 1/5    Overall satisfaction (1 = not at all satisfied, 5 = extremely satisfied): 3/5    Side effects: Migraines but not associated with hormones  NO Bothersome changes in sexual function, Hot flashes, Mood swings, Gastrointestinal issues, Blurry vision, and Dizziness/lightheadedness      {SUPERLIST (Optional):991823}  {additonal problems for provider to add (Optional):636407}    {ROS Picklists (Optional):737975}      Objective    /84 (BP Location: Left arm, Patient Position: Sitting, Cuff Size: Adult Regular)   Pulse 68   Temp 97.8  F (36.6  C) (Temporal)   Resp 17   Ht 1.753 m (5' 9\")   Wt 101.8 kg (224 lb 6.4 oz)   LMP  (LMP Unknown)   SpO2 96%   BMI 33.14 kg/m    Body mass index is 33.14 kg/m .  Physical Exam   {Exam List (Optional):121084}  General: Alert and oriented, in no acute distress.  Skin: Warm and dry, no abnormalities noted.  Eyes: Extra-ocular muscles grossly intact, pupils equal.  ENT: Speech intact, nasal passages open, no hearing impairment noted.  CV: No cyanosis or pallor, warm and well perfused.  Respiratory: No respiratory distress, no accessory muscle " use.  Neuro: Gait and station normal, comprehension intact. Gross and fine motor skills intact.   Psychiatric: Mood and affect appear normal.   Extremities: Warm, able to move all four extremities at will.    Component      Latest Ref Rng 1/27/2025  2:45 PM   Estradiol      pg/mL 597        Component      Latest Ref Rng 1/27/2025  2:45 PM   Testosterone Total      8 - 60 ng/dL 14          {Diagnostic Test Results (Optional):148828}        Signed Electronically by: Elham Us MD  {Email feedback regarding this note to [email or web address]   :361025}  Answers submitted by the patient for this visit:  Patient Health Questionnaire (Submitted on 4/10/2025)  If you checked off any problems, how difficult have these problems made it for you to do your work, take care of things at home, or get along with other people?: Somewhat difficult  PHQ9 TOTAL SCORE: 10

## 2025-04-15 ENCOUNTER — TELEPHONE (OUTPATIENT)
Dept: PLASTIC SURGERY | Facility: CLINIC | Age: 35
End: 2025-04-15
Payer: COMMERCIAL

## 2025-04-21 DIAGNOSIS — F64.9 GENDER DYSPHORIA: Primary | ICD-10-CM

## 2025-04-22 ENCOUNTER — TELEPHONE (OUTPATIENT)
Dept: PLASTIC SURGERY | Facility: CLINIC | Age: 35
End: 2025-04-22
Payer: COMMERCIAL

## 2025-04-24 ENCOUNTER — LAB (OUTPATIENT)
Dept: LAB | Facility: CLINIC | Age: 35
End: 2025-04-24
Payer: COMMERCIAL

## 2025-04-24 DIAGNOSIS — F64.9 GENDER DYSPHORIA: ICD-10-CM

## 2025-04-24 LAB — ESTRADIOL SERPL-MCNC: 622 PG/ML

## 2025-04-24 RX ORDER — ESTRADIOL VALERATE 20 MG/ML
6 INJECTION INTRAMUSCULAR WEEKLY
COMMUNITY
Start: 2025-04-24

## 2025-04-24 NOTE — RESULT ENCOUNTER NOTE
Results communicated to patient via [employer]    Plan: decrease estrogen dose from 7.6 mg to 6mg (0.3 mL)    Currently on: estradiol valerate 0.38 mLs (7.6 mg) once a week. Dose of estrogen was recently decreased from 10 mg -> 7.6 mg after supratherapeutic level in January.

## 2025-04-30 ENCOUNTER — TELEPHONE (OUTPATIENT)
Dept: FAMILY MEDICINE | Facility: CLINIC | Age: 35
End: 2025-04-30
Payer: COMMERCIAL

## 2025-04-30 DIAGNOSIS — F31.81 BIPOLAR 2 DISORDER (H): ICD-10-CM

## 2025-05-03 NOTE — TELEPHONE ENCOUNTER
Retail Pharmacy Prior Authorization Team   Phone: [phone]    PA Initiation    Medication: LURASIDONE HCL 40 MG PO TABS  Insurance Company: [employer] - Phone [phone] Fax [phone]  Pharmacy Filling the Rx: [id number] - [place], [place] - [address] [place] [address]  Filling Pharmacy Phone: [phone]  Filling Pharmacy Fax:    Start Date: 5/3/2025    Note: Due to record-high volumes, our turn-around time is taking longer than usual . We are currently 3  business days behind in the pools.   We are working diligently to submit all requests in a timely manner and in the order they are received. Please only flag TRUE URGENT requests as high priority to the pool at this time.   If you have questions on status of PA's,  please send a note/message in the active PA encounter and send back to the [facility] PA pool [[phone]].    If you have questions about the turn-around time or about our process, please reach out to our supervisor Lynda Lopes.   Thank you!   RPPA (Retail Pharmacy Prior Authorization) team    [id number]

## 2025-05-05 NOTE — TELEPHONE ENCOUNTER
Prior Authorization Not Needed per Insurance    Medication: LURASIDONE HCL 40 MG PO TABS  Insurance Company: [employer] - Phone [phone] Fax [phone]  Expected CoPay: $    Pharmacy Filling the Rx: [id number] - [facility] [address]    Received call from Nahomy at [facility] stating that PA is not needed when prescribed using the 80 mg tablets and to take half a tablet for daily dose of 40 mg.

## 2025-05-08 SDOH — HEALTH STABILITY: PHYSICAL HEALTH: ON AVERAGE, HOW MANY MINUTES DO YOU ENGAGE IN EXERCISE AT THIS LEVEL?: 30 MIN

## 2025-05-08 SDOH — HEALTH STABILITY: PHYSICAL HEALTH: ON AVERAGE, HOW MANY DAYS PER WEEK DO YOU ENGAGE IN MODERATE TO STRENUOUS EXERCISE (LIKE A BRISK WALK)?: 7 DAYS

## 2025-05-08 ASSESSMENT — SOCIAL DETERMINANTS OF HEALTH (SDOH): HOW OFTEN DO YOU GET TOGETHER WITH FRIENDS OR RELATIVES?: THREE TIMES A WEEK

## 2025-05-12 ENCOUNTER — OFFICE VISIT (OUTPATIENT)
Dept: FAMILY MEDICINE | Facility: CLINIC | Age: 35
End: 2025-05-12
Payer: COMMERCIAL

## 2025-05-12 VITALS
RESPIRATION RATE: 14 BRPM | HEIGHT: 69 IN | WEIGHT: 220.6 LBS | SYSTOLIC BLOOD PRESSURE: 126 MMHG | HEART RATE: 66 BPM | TEMPERATURE: 97.9 F | DIASTOLIC BLOOD PRESSURE: 82 MMHG | OXYGEN SATURATION: 99 % | BODY MASS INDEX: 32.67 KG/M2

## 2025-05-12 DIAGNOSIS — Z13.9 SCREENING FOR CONDITION: Primary | ICD-10-CM

## 2025-05-12 DIAGNOSIS — Z00.00 ROUTINE GENERAL MEDICAL EXAMINATION AT A HEALTH CARE FACILITY: ICD-10-CM

## 2025-05-12 DIAGNOSIS — F64.9 GENDER DYSPHORIA: ICD-10-CM

## 2025-05-12 LAB
CHOLEST SERPL-MCNC: 179 MG/DL
ESTRADIOL SERPL-MCNC: 343 PG/ML
FASTING STATUS PATIENT QL REPORTED: NO
HDLC SERPL-MCNC: 38 MG/DL
LDLC SERPL CALC-MCNC: 108 MG/DL
NONHDLC SERPL-MCNC: 141 MG/DL
TRIGL SERPL-MCNC: 166 MG/DL

## 2025-05-12 PROCEDURE — 99395 PREV VISIT EST AGE 18-39: CPT | Mod: 25

## 2025-05-12 PROCEDURE — 90746 HEPB VACCINE 3 DOSE ADULT IM: CPT

## 2025-05-12 PROCEDURE — 80061 LIPID PANEL: CPT

## 2025-05-12 PROCEDURE — 36415 COLL VENOUS BLD VENIPUNCTURE: CPT

## 2025-05-12 PROCEDURE — 82670 ASSAY OF TOTAL ESTRADIOL: CPT

## 2025-05-12 PROCEDURE — 90471 IMMUNIZATION ADMIN: CPT

## 2025-05-12 ASSESSMENT — PATIENT HEALTH QUESTIONNAIRE - PHQ9: SUM OF ALL RESPONSES TO PHQ QUESTIONS 1-9: 11

## 2025-05-12 NOTE — PATIENT INSTRUCTIONS
Patient Education   Preventive Care Advice   This is general advice given by our system to help you stay healthy. However, your care team may have specific advice just for you. Please talk to your care team about your preventive care needs.  Nutrition  Eat 5 or more servings of fruits and vegetables each day.  Try wheat bread, brown rice and whole grain pasta (instead of white bread, rice, and pasta).  Get enough calcium and vitamin D. Check the label on foods and aim for 100% of the RDA (recommended daily allowance).  Lifestyle  Exercise at least 150 minutes each week  (30 minutes a day, 5 days a week).  Do muscle strengthening activities 2 days a week. These help control your weight and prevent disease.  No smoking.  Wear sunscreen to prevent skin cancer.  Have a dental exam and cleaning every 6 months.  Yearly exams  See your health care team every year to talk about:  Any changes in your health.  Any medicines your care team has prescribed.  Preventive care, family planning, and ways to prevent chronic diseases.  Shots (vaccines)   HPV shots (up to age 26), if you've never had them before.  Hepatitis B shots (up to age 59), if you've never had them before.  COVID-19 shot: Get this shot when it's due.  Flu shot: Get a flu shot every year.  Tetanus shot: Get a tetanus shot every 10 years.  Pneumococcal, hepatitis A, and RSV shots: Ask your care team if you need these based on your risk.  Shingles shot (for age 50 and up)  General health tests  Diabetes screening:  Starting at age 35, Get screened for diabetes at least every 3 years.  If you are younger than age 35, ask your care team if you should be screened for diabetes.  Cholesterol test: At age 39, start having a cholesterol test every 5 years, or more often if advised.  Bone density scan (DEXA): At age 50, ask your care team if you should have this scan for osteoporosis (brittle bones).  Hepatitis C: Get tested at least once in your life.  STIs (sexually  transmitted infections)  Before age 24: Ask your care team if you should be screened for STIs.  After age 24: Get screened for STIs if you're at risk. You are at risk for STIs (including HIV) if:  You are sexually active with more than one person.  You don't use condoms every time.  You or a partner was diagnosed with a sexually transmitted infection.  If you are at risk for HIV, ask about PrEP medicine to prevent HIV.  Get tested for HIV at least once in your life, whether you are at risk for HIV or not.  Cancer screening tests  Cervical cancer screening: If you have a cervix, begin getting regular cervical cancer screening tests starting at age 21.  Breast cancer scan (mammogram): If you've ever had breasts, begin having regular mammograms starting at age 40. This is a scan to check for breast cancer.  Colon cancer screening: It is important to start screening for colon cancer at age 45.  Have a colonoscopy test every 10 years (or more often if you're at risk) Or, ask your provider about stool tests like a FIT test every year or Cologuard test every 3 years.  To learn more about your testing options, visit:   .  For help making a decision, visit:   https://bit.ly/it52961.  Prostate cancer screening test: If you have a prostate, ask your care team if a prostate cancer screening test (PSA) at age 55 is right for you.  Lung cancer screening: If you are a current or former smoker ages 50 to 80, ask your care team if ongoing lung cancer screenings are right for you.  For informational purposes only. Not to replace the advice of your health care provider. Copyright   2023 [employer]. All rights reserved. Clinically reviewed by the [facility]. [place] [id number] - REV 01/24.

## 2025-05-12 NOTE — PROGRESS NOTES
Preventive Care Visit  [facility] GILDA Us MD, Family Medicine  May 12, 2025      Assessment & Plan     Gender dysphoria  On gaHRT since 2011. Dose: 6mg (just decreased 4/24/2025 due to supra-therapeutic levels)  Shot day is today, will be trough level. Will extrapolate to estimate mid-cycle.   - Estradiol    Screening for condition  Family history of CVD is risk factor that indicated need for screening  - Lipid panel reflex to direct LDL Fasting            Counseling  Appropriate preventive services were addressed with this patient via screening, questionnaire, or discussion as appropriate for fall prevention, nutrition, physical activity, Tobacco-use cessation, social engagement, weight loss and cognition.  Checklist reviewing preventive services available has been given to the patient.  Reviewed patient's diet, addressing concerns and/or questions.   She is at risk for psychosocial distress and has been provided with information to reduce risk.   The patient's PHQ-9 score is consistent with moderate depression. She was provided with information regarding depression.     Recommend mammogram when she is 40 years old.         Subjective   Destiny is a 34 year old, presenting for the following:  Physical        5/12/2025    10:39 AM   Additional Questions   Roomed by Franklin         5/12/2025    Information    services provided? No          HPI           Advance Care Planning    Discussed advance care planning with patient; informed AVS has link to [facility].        5/8/2025   General Health   How would you rate your overall physical health? Good   Feel stress (tense, anxious, or unable to sleep) Very much   (!) STRESS CONCERN      5/8/2025   Nutrition   Three or more servings of calcium each day? (!) NO   Diet: Regular (no restrictions)   How many servings of fruit and vegetables per day? (!) 2-3   How many sweetened beverages each day? 0-1         5/8/2025   Exercise    Days per week of moderate/strenous exercise 7 days   Average minutes spent exercising at this level 30 min         5/8/2025   Social Factors   Frequency of gathering with friends or relatives Three times a week   Worry food won't last until get money to buy more No   Food not last or not have enough money for food? No   Do you have housing? (Housing is defined as stable permanent housing and does not include staying outside in a car, in a tent, in an abandoned building, in an overnight shelter, or couch-surfing.) Yes   Are you worried about losing your housing? No   Lack of transportation? No   Unable to get utilities (heat,electricity)? No         5/8/2025   Dental   Dentist two times every year? Yes         Today's PHQ-2 Score:       5/12/2025    10:35 AM   PHQ-2 ( 1999 [employer])   Q1: Little interest or pleasure in doing things 1   Q2: Feeling down, depressed or hopeless 1   PHQ-2 Score 2    Q1: Little interest or pleasure in doing things Several days   Q2: Feeling down, depressed or hopeless Several days   PHQ-2 Score 2       Patient-reported           5/8/2025   Substance Use   Alcohol more than 3/day or more than 7/wk No   Do you use any other substances recreationally? (!) CANNABIS PRODUCTS     Social History     Tobacco Use    Smoking status: Never     Passive exposure: Never    Smokeless tobacco: Never   Vaping Use    Vaping status: Never Used   Substance Use Topics    Alcohol use: No    Drug use: Yes     Types: Marijuana          Mammogram Screening - Patient under 40 years of age: Routine Mammogram Screening not recommended.         5/8/2025   STI Screening   New sexual partner(s) since last STI/HIV test? No     History of abnormal Pap smear: does not have cervix, will update organ inventory              5/8/2025   Contraception/Family Planning   Questions about contraception or family planning No        Reviewed and updated as needed this visit by Provider                             Objective   "  Exam  /82   Pulse 66   Temp 97.9  F (36.6  C) (Temporal)   Resp 14   Ht 1.753 m (5' 9\")   Wt 100.1 kg (220 lb 9.6 oz)   LMP  (LMP Unknown)   SpO2 99%   BMI 32.58 kg/m     Estimated body mass index is 32.58 kg/m  as calculated from the following:    Height as of this encounter: 1.753 m (5' 9\").    Weight as of this encounter: 100.1 kg (220 lb 9.6 oz).    Physical Exam  General: Alert and oriented, in no acute distress.  Skin: Warm and dry, no abnormalities noted.  Eyes: Extra-ocular muscles grossly intact, pupils equal.  ENT: Speech intact, nasal passages open, no hearing impairment noted. Normal Tms and canal. Enlarged tonsils.   CV: No cyanosis or pallor, warm and well perfused.  Respiratory: No respiratory distress, no accessory muscle use. LTAB  Neuro: Gait and station normal, comprehension intact. Gross and fine motor skills intact.   Psychiatric: Mood and affect appear normal.   Extremities: Warm, able to move all four extremities at will.          Signed Electronically by: Elham Us MD    "

## 2025-05-14 ENCOUNTER — RESULTS FOLLOW-UP (OUTPATIENT)
Dept: UROLOGY | Facility: CLINIC | Age: 35
End: 2025-05-14

## 2025-05-14 NOTE — RESULT ENCOUNTER NOTE
Results communicated via [facility]    Estradiol is a trough level. Recently reduced dose to estradiol valerate 6mg injected q7d.   Extrapolating this level, midcycle is likely above goal range     Reduce dose from 6mg to 5mg weekly (0.25 mL)

## 2025-05-20 ENCOUNTER — TELEPHONE (OUTPATIENT)
Dept: PLASTIC SURGERY | Facility: CLINIC | Age: 35
End: 2025-05-20
Payer: COMMERCIAL

## 2025-05-20 NOTE — TELEPHONE ENCOUNTER
Latasha called looking for status update on her PA process for [facility]. RN explained orders for surgery are in and we started the process of contacting insurance and checking benefits today. No further questions.   Norma Suresh RN on 5/20/2025 at 11:11 AM

## 2025-06-02 DIAGNOSIS — F64.9 GENDER DYSPHORIA: Primary | ICD-10-CM

## 2025-06-02 NOTE — PROGRESS NOTES
These orders replace the first case request. Now seeking only bilateral orchiectomy without scrotectomy

## 2025-06-04 ENCOUNTER — MYC MEDICAL ADVICE (OUTPATIENT)
Dept: PLASTIC SURGERY | Facility: CLINIC | Age: 35
End: 2025-06-04
Payer: COMMERCIAL

## 2025-06-04 ENCOUNTER — TELEPHONE (OUTPATIENT)
Dept: UROLOGY | Facility: CLINIC | Age: 35
End: 2025-06-04
Payer: COMMERCIAL

## 2025-06-04 NOTE — TELEPHONE ENCOUNTER
Called patient to schedule surgery with Dr Vargas    Spoke with: Destiny (patient)     Date of Surgery: 7/1/25    Estimated Arrival time Discussed with Patient:  Yes    Location of surgery: [facility]     Pre-Op H&P: [facility]    Labs: Not Applicable     Imaging: Not Applicable     Post-Op Appt Date: TOD Elias, CNP   Needs 1 wk po for drain removal. Nothing available in schedule    Post-Op Imaging Date:  Not Applicable     Discussed with patient pre-op RN will call 2-3 days prior to surgery with arrival time and instructions:  Yes     Standard Surgery Packet Sent: Yes 06/04/25  via Mail - Standard + SOAP      Additional Comments:       All patients questions were answered and was instructed to review surgical packet and call back with any questions or concerns.       Judy Malloy on 6/4/2025 at 8:28 AM

## 2025-06-05 ENCOUNTER — OFFICE VISIT (OUTPATIENT)
Dept: FAMILY MEDICINE | Facility: CLINIC | Age: 35
End: 2025-06-05
Payer: COMMERCIAL

## 2025-06-05 VITALS
OXYGEN SATURATION: 99 % | SYSTOLIC BLOOD PRESSURE: 122 MMHG | HEIGHT: 69 IN | HEART RATE: 58 BPM | WEIGHT: 226 LBS | BODY MASS INDEX: 33.47 KG/M2 | TEMPERATURE: 98 F | RESPIRATION RATE: 17 BRPM | DIASTOLIC BLOOD PRESSURE: 77 MMHG

## 2025-06-05 DIAGNOSIS — Z01.818 PREOP GENERAL PHYSICAL EXAM: Primary | ICD-10-CM

## 2025-06-05 PROCEDURE — 99213 OFFICE O/P EST LOW 20 MIN: CPT | Mod: GC

## 2025-06-05 NOTE — PATIENT INSTRUCTIONS
How to Take Your Medication Before Surgery  Preoperative Medication Instructions   Antiplatelet or Anticoagulation Medication Instructions   - We reviewed the medication list and the patient is not on an antiplatelet or anticoagulation medications.   - Bleeding risk is low for this procedure (e.g. dental, skin, cataract).    Additional Medication Instructions  Take all scheduled medications on the day of surgery EXCEPT for modifications listed below:   Skip one week of estradiol injection after the procedure.     Patient Education   Preparing for Your Surgery  For Adults  Getting started  In most cases, a nurse will call to review your health history and instructions. They will give you an arrival time based on your scheduled surgery time. Please be ready to share:  Your doctor's clinic name and phone number  Your medical, surgical, and anesthesia history  A list of allergies and sensitivities  A list of medicines, including herbal treatments and over-the-counter drugs  Whether the patient has a legal guardian (ask how to send us the papers in advance)  Note: You may not receive a call if you were seen at our PAC (Preoperative Assessment Center).  Please tell us if you're pregnant--or if there's any chance you might be pregnant. Some surgeries may injure a fetus (unborn baby), so they require a pregnancy test. Surgeries that are safe for a fetus don't always need a test, and you can choose whether to have one.   Preparing for surgery  Within 10 to 30 days of surgery: Have a pre-op exam (sometimes called an H&P, or History and Physical). This can be done at a clinic or pre-operative center.  If you're having a , you may not need this exam. Talk to your care team.  At your pre-op exam, talk to your care team about all medicines you take. (This includes CBD oil and any drugs, such as THC, marijuana, and other forms of cannabis.) If you need to stop any medicine before surgery, ask when to start taking it  again.  This is for your safety. Many medicines and drugs can make you bleed too much during surgery. Some change how well surgery (anesthesia) drugs work.  Call your insurance company to let them know you're having surgery. (If you don't have insurance, call [phone].)  Call your clinic if there's any change in your health. This includes a scrape or scratch near the surgery site, or any signs of a cold (sore throat, runny nose, cough, rash, fever).  Eating and drinking guidelines  For your safety: Unless your surgeon tells you otherwise, follow the guidelines below.  Eat and drink as normal until 8 hours before you arrive for surgery. After that, no food or milk. You can spit out gum when you arrive.  Drink clear liquids until 2 hours before you arrive. These are liquids you can see through, like water, Gatorade, and Propel Water. They also include plain black coffee and tea (no cream or milk).  No alcohol for 24 hours before you arrive. The night before surgery, stop any drinks that contain THC.  If your care team tells you to take medicine on the morning of surgery, it's okay to take it with a sip of water. No other medicines or drugs are allowed (including CBD oil)--follow your care team's instructions.  If you have questions the day of surgery, call your hospital or surgery center.   Preventing infection  Shower or bathe the night before and the morning of surgery. Follow the instructions your clinic gave you. (If no instructions, use regular soap.)  Don't shave or clip hair near your surgery site. We'll remove the hair if needed.  Don't smoke or vape the morning of surgery. No chewing tobacco for 6 hours before you arrive. A nicotine patch is okay. You may spit out nicotine gum when you arrive.  For some surgeries, the surgeon will tell you to fully quit smoking and nicotine.  We will make every effort to keep you safe from infection. We will:  Clean our hands often with soap and water (or an alcohol-based  hand rub).  Clean the skin at your surgery site with a special soap that kills germs.  Give you a special gown to keep you warm. (Cold raises the risk of infection.)  Wear hair covers, masks, gowns, and gloves during surgery.  Give antibiotic medicine, if prescribed. Not all surgeries need this medicine.  What to bring on the day of surgery  Photo ID and insurance card  Copy of your health care directive, if you have one  Glasses and hearing aids (bring cases)  You can't wear contacts during surgery  Inhaler and eye drops, if you use them (tell us about these when you arrive)  CPAP machine or breathing device, if you use them  A few personal items, if spending the night  If you have . . .  A pacemaker, ICD (cardiac defibrillator), or other implant: Bring the ID card.  An implanted stimulator: Bring the remote control.  A legal guardian: Bring a copy of the certified (court-stamped) guardianship papers.  Please remove any jewelry, including body piercings. Leave jewelry and other valuables at home.  If you're going home the day of surgery  You must have a support person drive you home. They should stay with you overnight, and they may need to help with your self-care.  If you don't have a support person, please tells us as soon as possible. We can help.  After surgery  If it's hard to control your pain or you need more pain medicine, please call your surgeon's office.  Questions?   If you have any questions for your care team, list them here:   ____________________________________________________________________________________________________________________________________________________________________________________________________________________________________________________________  For informational purposes only. Not to replace the advice of your health care provider. Copyright   2003, 2019 [facility]. All rights reserved. Clinically reviewed by Miguel Kirkpatrick MD. [employer] [id number] - REV  02/25.      fever).  Eating and drinking guidelines  For your safety: Unless your surgeon tells you otherwise, follow the guidelines below.  Eat and drink as normal until 8 hours before you arrive for surgery. After that, no food or milk. You can spit out gum when you arrive.  Drink clear liquids until 2 hours before you arrive. These are liquids you can see through, like water, Gatorade, and Propel Water. They also include plain black coffee and tea (no cream or milk).  No alcohol for 24 hours before you arrive. The night before surgery, stop any drinks that contain THC.  If your care team tells you to take medicine on the morning of surgery, it's okay to take it with a sip of water. No other medicines or drugs are allowed (including CBD oil)--follow your care team's instructions.  If you have questions the day of surgery, call your hospital or surgery center.   Preventing infection  Shower or bathe the night before and the morning of surgery. Follow the instructions your clinic gave you. (If no instructions, use regular soap.)  Don't shave or clip hair near your surgery site. We'll remove the hair if needed.  Don't smoke or vape the morning of surgery. No chewing tobacco for 6 hours before you arrive. A nicotine patch is okay. You may spit out nicotine gum when you arrive.  For some surgeries, the surgeon will tell you to fully quit smoking and nicotine.  We will make every effort to keep you safe from infection. We will:  Clean our hands often with soap and water (or an alcohol-based hand rub).  Clean the skin at your surgery site with a special soap that kills germs.  Give you a special gown to keep you warm. (Cold raises the risk of infection.)  Wear hair covers, masks, gowns, and gloves during surgery.  Give antibiotic medicine, if prescribed. Not all surgeries need this medicine.  What to bring on the day of surgery  Photo ID and insurance card  Copy of your health care directive, if you have one  Glasses and hearing  aids (bring cases)  You can't wear contacts during surgery  Inhaler and eye drops, if you use them (tell us about these when you arrive)  CPAP machine or breathing device, if you use them  A few personal items, if spending the night  If you have . . .  A pacemaker, ICD (cardiac defibrillator), or other implant: Bring the ID card.  An implanted stimulator: Bring the remote control.  A legal guardian: Bring a copy of the certified (court-stamped) guardianship papers.  Please remove any jewelry, including body piercings. Leave jewelry and other valuables at home.  If you're going home the day of surgery  You must have a support person drive you home. They should stay with you overnight, and they may need to help with your self-care.  If you don't have a support person, please tells us as soon as possible. We can help.  After surgery  If it's hard to control your pain or you need more pain medicine, please call your surgeon's office.  Questions?   If you have any questions for your care team, list them here:   ____________________________________________________________________________________________________________________________________________________________________________________________________________________________________________________________  For informational purposes only. Not to replace the advice of your health care provider. Copyright   2003, 2019 [facility]. All rights reserved. Clinically reviewed by Miguel Kirkpatrick MD. [employer] [id number] - REV 02/25.

## 2025-06-05 NOTE — TELEPHONE ENCOUNTER
Patient called to remind team that they had changed their surgery plan to be a Orchi only.   RN sent staff msg to Dr Vargas to change order. RN returned patient call and explained we would change the orders and set up a virtual post-op appt.   Norma Suresh RN on 6/5/2025 at 9:10 AM

## 2025-06-05 NOTE — PROGRESS NOTES
Preoperative Evaluation  [address]  [address] [place] [id number]  Phone: [phone]  Fax: [phone]  Primary Provider: lEham Us MD  Pre-op Performing Provider: Valencia Lopez DO  Jun 5, 2025   {Provider  Link to PREOP SmartSet  REQUIRED to apply standard patient instructions and medication directions to the AVS :538835}  {ROOMER review and update patient entered surgical information if needed :121829}        6/5/2025   Surgical Information   What procedure is being done? orchiectomy   Facility or Hospital where procedure/surgery will be performed: [facility]   Who is doing the procedure / surgery? Dr. Vargas   Date of surgery / procedure: 7/1/2025   Time of surgery / procedure: tba   Where do you plan to recover after surgery? at home with family     Fax number for surgical facility: Note does not need to be faxed, will be available electronically in Epic.    {Provider Charting Preference for Preop :343839}    Subjective   Destiny is a 34 year old, presenting for the following:  Pre-Op Exam          6/5/2025    10:44 AM   Additional Questions   Roomed by cygi   Accompanied by self         6/5/2025    10:44 AM   Patient Reported Additional Medications   Patient reports taking the following new medications none         6/5/2025    Information    services provided? No     HPI: excited          6/5/2025   Pre-Op Questionnaire   Have you ever had a heart attack or stroke? No   Have you ever had surgery on your heart or blood vessels, such as a stent placement, a coronary artery bypass, or surgery on an artery in your head, neck, heart, or legs? No   Do you have chest pain with activity? No   Do you have a history of heart failure? No   Do you currently have a cold, bronchitis or symptoms of other infection? No   Do you have a cough, shortness of breath, or wheezing? No   Do you or anyone in your family have previous history of blood  clots? No   Do you or does anyone in your family have a serious bleeding problem such as prolonged bleeding following surgeries or cuts? No   Have you ever had problems with anemia or been told to take iron pills? No   Have you had any abnormal blood loss such as black, tarry or bloody stools, or abnormal vaginal bleeding? No   Have you ever had a blood transfusion? No   Are you willing to have a blood transfusion if it is medically needed before, during, or after your surgery? Yes   Have you or any of your relatives ever had problems with anesthesia? No   Do you have sleep apnea, excessive snoring or daytime drowsiness? No   Do you have any artifical heart valves or other implanted medical devices like a pacemaker, defibrillator, or continuous glucose monitor? No   Do you have artificial joints? No   Are you allergic to latex? No     Advance Care Planning  Discussed advance care planning with patient; informed AVS has link to [facility] [employer].    Preoperative Review of    reviewed - no record of controlled substances prescribed.      Patient Active Problem List    Diagnosis Date Noted    Gender dysphoria 04/21/2025     Priority: Medium    Depression 03/24/2025     Priority: Medium    Bipolar 2 disorder (H)      Priority: Medium    OCD (obsessive compulsive disorder)      Priority: Medium    Gastroesophageal reflux disease without esophagitis      Priority: Medium    Globus sensation 08/25/2023     Priority: Medium    Gender Dysphoria 03/05/2015     Priority: Medium    Major depressive disorder, recurrent episode, moderate (H) 03/05/2015     Priority: Medium    PTSD (post-traumatic stress disorder) 03/05/2015     Priority: Medium    MENTAL HEALTH 08/14/2014     Priority: Medium    Major depressive disorder, single episode 02/10/2014     Priority: Medium     Problem list name updated by automated process. Provider to review      Sexuality issues 01/22/2014     Priority: Medium     Patient is actively seeking  gender reassignment.  He is doing well - has some anxiety surrounding this - but is committed and will be seeking specialty management for hormone and possible surgical reassignment options.      Eosinophilic esophagitis 03/06/2013     Priority: Medium     See endoscopy, 2/2013      Epigastric pain 02/08/2013     Priority: Medium    Anxiety 02/08/2013     Priority: Medium    Panic attack 02/08/2013     Priority: Medium    Lactose intolerance 01/10/2013     Priority: Medium    ADHD (attention deficit hyperactivity disorder) 01/10/2013     Priority: Medium    CARDIOVASCULAR SCREENING; LDL GOAL LESS THAN 160 02/07/2011     Priority: Medium      Past Medical History:   Diagnosis Date    ADHD (attention deficit hyperactivity disorder)     Allergic rhinitis, cause unspecified     Bipolar 2 disorder (H)     Gastroesophageal reflux disease without esophagitis     Headache(784.0)     Hormone replacement therapy     OCD (obsessive compulsive disorder)     Unspecified adjustment reaction      Past Surgical History:   Procedure Laterality Date    HERNIA REPAIR  as an infant    mole removed  12/2024    precancerous, clear margins     Current Outpatient Medications   Medication Sig Dispense Refill    busPIRone (BUSPAR) 10 MG tablet Take 1 tablet (10 mg) by mouth 2 times daily. 180 tablet 1    cetirizine (ZYRTEC) 10 MG tablet Take 10 mg by mouth daily.      estradiol valerate (DELESTROGEN) 20 MG/ML injection Inject 5 mg into the muscle once a week. Discard vial after 28 days after puncture      LamoTRIgine (LAMICTAL XR) 300 MG 24 hr tablet Take 1 tablet (300 mg) by mouth daily. 90 tablet 1    lurasidone (LATUDA) 40 MG TABS tablet Take 1 tablet (40 mg) by mouth daily with food. 90 tablet 3    minocycline (MINOCIN) 100 MG capsule Take 1 capsule (100 mg) by mouth 2 times daily. 180 capsule 1    omeprazole (PRILOSEC) 20 MG DR capsule Take 1 capsule (20 mg) by mouth daily. 90 capsule 3    progesterone (PROMETRIUM) 100 MG capsule Take 1  "capsule (100 mg) by mouth daily. 30 capsule 3    propranolol (INDERAL) 10 MG tablet Take 1 tablet by mouth daily as needed.      spironolactone (ALDACTONE) 25 MG tablet Take 2 tablets (50 mg) by mouth daily. 270 tablet 1    SUMAtriptan (IMITREX) 50 MG tablet Take 1 tablet (50 mg) by mouth at onset of headache for migraine. May repeat in 2 hours. Max 4 tablets/24 hours. 12 tablet 0    topiramate (TOPAMAX) 25 MG tablet Take 1 tablet (25 mg) by mouth daily. Increase to 50mg after 1 week if no side effects / side effects ok 30 tablet 3       Allergies   Allergen Reactions    Amoxicillin     Azithromycin Hives    Biaxin [Clarithromycin]     Erythromycin         Social History     Tobacco Use    Smoking status: Never     Passive exposure: Never    Smokeless tobacco: Never   Substance Use Topics    Alcohol use: No     Family History   Problem Relation Age of Onset    Alcohol/Drug Father         recovered    Depression Mother         anxiety    Depression Father         anxiety    Depression Sister         social anxiety    Depression Maternal Grandmother     Depression Paternal Grandfather     Alcohol/Drug Paternal Grandfather     C.A.D. Paternal Grandmother     Diabetes Paternal Grandmother     Obesity Paternal Grandmother     Obesity Paternal Grandfather     Cancer No family hx of      History   Drug Use    Types: Marijuana         Review of Systems  Constitutional, neuro, ENT, endocrine, pulmonary, cardiac, gastrointestinal, genitourinary, musculoskeletal, integument and psychiatric systems are negative, except as otherwise noted.    Objective    /77   Pulse 58   Temp 98  F (36.7  C)   Resp 17   Ht 1.753 m (5' 9\")   Wt 102.5 kg (226 lb)   SpO2 99%   BMI 33.37 kg/m     Estimated body mass index is 33.37 kg/m  as calculated from the following:    Height as of this encounter: 1.753 m (5' 9\").    Weight as of this encounter: 102.5 kg (226 lb).    Physical Exam  Vitals reviewed.   Constitutional:       " Appearance: Normal appearance.   HENT:      Head: Normocephalic and atraumatic.   Eyes:      Conjunctiva/sclera: Conjunctivae normal.   Cardiovascular:      Rate and Rhythm: Normal rate and regular rhythm.      Heart sounds: Normal heart sounds.   Pulmonary:      Effort: Pulmonary effort is normal.   Neurological:      General: No focal deficit present.      Mental Status: She is alert and oriented to person, place, and time.

## 2025-06-06 ENCOUNTER — MYC MEDICAL ADVICE (OUTPATIENT)
Dept: PLASTIC SURGERY | Facility: CLINIC | Age: 35
End: 2025-06-06
Payer: COMMERCIAL

## 2025-06-08 DIAGNOSIS — F64.9 GENDER DYSPHORIA: ICD-10-CM

## 2025-06-09 DIAGNOSIS — G43.009 MIGRAINE WITHOUT AURA AND WITHOUT STATUS MIGRAINOSUS, NOT INTRACTABLE: ICD-10-CM

## 2025-06-09 NOTE — TELEPHONE ENCOUNTER
"Request for medication refill:    Medication Name: progesterone (PROMETRIUM) 100 MG capsule     Providers if patient needs an appointment and you are willing to give a one month supply please refill for one month and  send a MyChart using \".SMILLIMITEDREFILL\" .Or route chart to \"P [facility] \" . To call patient and inform of limited refill and providers request to make an appointment. (Giving one month refill in non controlled medications is strongly recommended before denial)    If refill has been denied, meaning absolutely no refills without visit, please complete the smart phrase \".SMIRXREFUSE\" and route it to the \"P [facility] MED REFILLS\"  pool to inform the patient and the pharmacy.    Carolyn Resendiz RN      "

## 2025-06-09 NOTE — PROGRESS NOTES
Preceptor Attestation:   Patient seen, evaluated and discussed with the resident. I have verified the content of the note, which accurately reflects my assessment of the patient and the plan of care.   Supervising Physician:  Parish Castillo MD

## 2025-06-10 RX ORDER — PROGESTERONE 100 MG/1
100 CAPSULE ORAL DAILY
Qty: 30 CAPSULE | Refills: 0 | Status: SHIPPED | OUTPATIENT
Start: 2025-06-10

## 2025-06-10 NOTE — TELEPHONE ENCOUNTER
"Request for medication refill:    Medication Name: SUMAtriptan (IMITREX) 50 MG tablet     Providers if patient needs an appointment and you are willing to give a one month supply please refill for one month and  send a MyChart using \".SMILLIMITEDREFILL\" .Or route chart to \"P [facility] \" . To call patient and inform of limited refill and providers request to make an appointment. (Giving one month refill in non controlled medications is strongly recommended before denial)    If refill has been denied, meaning absolutely no refills without visit, please complete the smart phrase \".SMIRXREFUSE\" and route it to the \"P [facility] MED REFILLS\"  pool to inform the patient and the pharmacy.    Carolyn Resendiz RN      "

## 2025-06-11 ENCOUNTER — TELEPHONE (OUTPATIENT)
Dept: PLASTIC SURGERY | Facility: CLINIC | Age: 35
End: 2025-06-11
Payer: COMMERCIAL

## 2025-06-11 NOTE — TELEPHONE ENCOUNTER
Pre and Post Op Patient Education                                       Diagnosis: gender dysphoria  Teaching pre and post op for: Destiny Slaughter  Person involved in teaching: patient    Patient demonstrates an understanding of the following:  - Date of surgery:  7/1/2025, Dr Vargas  - Surgery time: 1 pm (pt understands that this time could change)  - Location of surgery: [facility] [place]   - Pre-operative history physical: Completed 6/5  - Post-op follow-up:   7/17/2025    Nicotine use: denies    Patient verbalizes an understanding of the following:  - The need for a responsible adult  and someone to stay with them for the first 24 hours post-operatively: YES  - NPO per anesthesia guidelines: YES  - Pre-op showering x2 with Hibiclens/chlorhexidine soap: YES  - Holding ASA, NSAIDs, Multivitamins/supplements 1 week before surgery and whatever medications their doctors tell them to hold: YES  - holding spironolactone day of (holding sumatriptan), do not need to discontinue Estradiol as DO suggested  - Removing piercings YES    Discussed   - Pain management after surgery and discharge medications  - Avoiding constipation  - Infection prevention and signs and symptoms of infection  - Surgical procedure site care  - Normal recovery and possible complications to watch for  - Restrictions after surgery  - Information about how and when to contact the hospital, nurse, and clinic if needed    Postoperative Plans:  On disability, not working     Surgical instructions given to patient via phone.    Total time with patient: 15 minutes

## 2025-06-12 RX ORDER — SUMATRIPTAN 50 MG/1
50 TABLET, FILM COATED ORAL
Qty: 12 TABLET | Refills: 0 | Status: SHIPPED | OUTPATIENT
Start: 2025-06-12

## 2025-06-30 ENCOUNTER — ANESTHESIA EVENT (OUTPATIENT)
Dept: SURGERY | Facility: AMBULATORY SURGERY CENTER | Age: 35
End: 2025-06-30
Payer: COMMERCIAL

## 2025-07-01 ENCOUNTER — ANESTHESIA (OUTPATIENT)
Dept: SURGERY | Facility: AMBULATORY SURGERY CENTER | Age: 35
End: 2025-07-01
Payer: COMMERCIAL

## 2025-07-01 ENCOUNTER — HOSPITAL ENCOUNTER (OUTPATIENT)
Facility: AMBULATORY SURGERY CENTER | Age: 35
Discharge: HOME | End: 2025-07-01
Attending: STUDENT IN AN ORGANIZED HEALTH CARE EDUCATION/TRAINING PROGRAM | Admitting: STUDENT IN AN ORGANIZED HEALTH CARE EDUCATION/TRAINING PROGRAM
Payer: COMMERCIAL

## 2025-07-01 VITALS
BODY MASS INDEX: 33.47 KG/M2 | WEIGHT: 226 LBS | OXYGEN SATURATION: 100 % | DIASTOLIC BLOOD PRESSURE: 82 MMHG | HEIGHT: 69 IN | SYSTOLIC BLOOD PRESSURE: 142 MMHG | RESPIRATION RATE: 16 BRPM | HEART RATE: 61 BPM | TEMPERATURE: 97.2 F

## 2025-07-01 DIAGNOSIS — T14.8XXA HEMATOMA: ICD-10-CM

## 2025-07-01 DIAGNOSIS — F64.9 GENDER DYSPHORIA: Primary | ICD-10-CM

## 2025-07-01 PROCEDURE — 88302 TISSUE EXAM BY PATHOLOGIST: CPT | Mod: 26 | Performed by: PATHOLOGY

## 2025-07-01 PROCEDURE — 88302 TISSUE EXAM BY PATHOLOGIST: CPT | Mod: TC | Performed by: STUDENT IN AN ORGANIZED HEALTH CARE EDUCATION/TRAINING PROGRAM

## 2025-07-01 RX ORDER — DEXAMETHASONE SODIUM PHOSPHATE 10 MG/ML
4 INJECTION, SOLUTION INTRAMUSCULAR; INTRAVENOUS
Status: DISCONTINUED | OUTPATIENT
Start: 2025-07-01 | End: 2025-07-02 | Stop reason: HOSPADM

## 2025-07-01 RX ORDER — ONDANSETRON 4 MG/1
4 TABLET, ORALLY DISINTEGRATING ORAL EVERY 30 MIN PRN
Status: DISCONTINUED | OUTPATIENT
Start: 2025-07-01 | End: 2025-07-02 | Stop reason: HOSPADM

## 2025-07-01 RX ORDER — ACETAMINOPHEN 325 MG/1
650 TABLET ORAL EVERY 4 HOURS PRN
Qty: 50 TABLET | Refills: 0 | Status: SHIPPED | OUTPATIENT
Start: 2025-07-01

## 2025-07-01 RX ORDER — OXYCODONE HYDROCHLORIDE 5 MG/1
5 TABLET ORAL
Status: DISCONTINUED | OUTPATIENT
Start: 2025-07-01 | End: 2025-07-02 | Stop reason: HOSPADM

## 2025-07-01 RX ORDER — OXYCODONE HYDROCHLORIDE 5 MG/1
10 TABLET ORAL
Status: DISCONTINUED | OUTPATIENT
Start: 2025-07-01 | End: 2025-07-02 | Stop reason: HOSPADM

## 2025-07-01 RX ORDER — FENTANYL CITRATE 50 UG/ML
25 INJECTION, SOLUTION INTRAMUSCULAR; INTRAVENOUS EVERY 5 MIN PRN
Status: DISCONTINUED | OUTPATIENT
Start: 2025-07-01 | End: 2025-07-02 | Stop reason: HOSPADM

## 2025-07-01 RX ORDER — FENTANYL CITRATE 50 UG/ML
25 INJECTION, SOLUTION INTRAMUSCULAR; INTRAVENOUS
Status: DISCONTINUED | OUTPATIENT
Start: 2025-07-01 | End: 2025-07-02 | Stop reason: HOSPADM

## 2025-07-01 RX ORDER — NALOXONE HYDROCHLORIDE 0.4 MG/ML
0.1 INJECTION, SOLUTION INTRAMUSCULAR; INTRAVENOUS; SUBCUTANEOUS
Status: DISCONTINUED | OUTPATIENT
Start: 2025-07-01 | End: 2025-07-02 | Stop reason: HOSPADM

## 2025-07-01 RX ORDER — BUPIVACAINE HYDROCHLORIDE 5 MG/ML
INJECTION, SOLUTION EPIDURAL; INTRACAUDAL; PERINEURAL PRN
Status: DISCONTINUED | OUTPATIENT
Start: 2025-07-01 | End: 2025-07-01 | Stop reason: HOSPADM

## 2025-07-01 RX ORDER — LIDOCAINE 40 MG/G
CREAM TOPICAL
Status: DISCONTINUED | OUTPATIENT
Start: 2025-07-01 | End: 2025-07-02 | Stop reason: HOSPADM

## 2025-07-01 RX ORDER — SODIUM CHLORIDE, SODIUM LACTATE, POTASSIUM CHLORIDE, CALCIUM CHLORIDE 600; 310; 30; 20 MG/100ML; MG/100ML; MG/100ML; MG/100ML
INJECTION, SOLUTION INTRAVENOUS CONTINUOUS PRN
Status: DISCONTINUED | OUTPATIENT
Start: 2025-07-01 | End: 2025-07-01

## 2025-07-01 RX ORDER — HYDROMORPHONE HYDROCHLORIDE 1 MG/ML
0.2 INJECTION, SOLUTION INTRAMUSCULAR; INTRAVENOUS; SUBCUTANEOUS EVERY 5 MIN PRN
Status: DISCONTINUED | OUTPATIENT
Start: 2025-07-01 | End: 2025-07-02 | Stop reason: HOSPADM

## 2025-07-01 RX ORDER — ONDANSETRON 2 MG/ML
INJECTION INTRAMUSCULAR; INTRAVENOUS PRN
Status: DISCONTINUED | OUTPATIENT
Start: 2025-07-01 | End: 2025-07-01

## 2025-07-01 RX ORDER — SODIUM CHLORIDE, SODIUM LACTATE, POTASSIUM CHLORIDE, CALCIUM CHLORIDE 600; 310; 30; 20 MG/100ML; MG/100ML; MG/100ML; MG/100ML
INJECTION, SOLUTION INTRAVENOUS CONTINUOUS
Status: DISCONTINUED | OUTPATIENT
Start: 2025-07-01 | End: 2025-07-02 | Stop reason: HOSPADM

## 2025-07-01 RX ORDER — ONDANSETRON 2 MG/ML
4 INJECTION INTRAMUSCULAR; INTRAVENOUS EVERY 30 MIN PRN
Status: DISCONTINUED | OUTPATIENT
Start: 2025-07-01 | End: 2025-07-02 | Stop reason: HOSPADM

## 2025-07-01 RX ORDER — HYDROMORPHONE HYDROCHLORIDE 1 MG/ML
0.4 INJECTION, SOLUTION INTRAMUSCULAR; INTRAVENOUS; SUBCUTANEOUS EVERY 5 MIN PRN
Status: DISCONTINUED | OUTPATIENT
Start: 2025-07-01 | End: 2025-07-02 | Stop reason: HOSPADM

## 2025-07-01 RX ORDER — OXYCODONE HYDROCHLORIDE 5 MG/1
5 TABLET ORAL EVERY 6 HOURS PRN
Qty: 6 TABLET | Refills: 0 | Status: SHIPPED | OUTPATIENT
Start: 2025-07-01 | End: 2025-07-03

## 2025-07-01 RX ORDER — ACETAMINOPHEN 325 MG/1
975 TABLET ORAL ONCE
Status: COMPLETED | OUTPATIENT
Start: 2025-07-01 | End: 2025-07-01

## 2025-07-01 RX ORDER — AMOXICILLIN 250 MG
1-2 CAPSULE ORAL 2 TIMES DAILY
Qty: 30 TABLET | Refills: 0 | Status: SHIPPED | OUTPATIENT
Start: 2025-07-01

## 2025-07-01 RX ORDER — DEXAMETHASONE SODIUM PHOSPHATE 4 MG/ML
INJECTION, SOLUTION INTRA-ARTICULAR; INTRALESIONAL; INTRAMUSCULAR; INTRAVENOUS; SOFT TISSUE PRN
Status: DISCONTINUED | OUTPATIENT
Start: 2025-07-01 | End: 2025-07-01

## 2025-07-01 RX ORDER — KETOROLAC TROMETHAMINE 30 MG/ML
INJECTION, SOLUTION INTRAMUSCULAR; INTRAVENOUS PRN
Status: DISCONTINUED | OUTPATIENT
Start: 2025-07-01 | End: 2025-07-01

## 2025-07-01 RX ORDER — PROPOFOL 10 MG/ML
INJECTION, EMULSION INTRAVENOUS PRN
Status: DISCONTINUED | OUTPATIENT
Start: 2025-07-01 | End: 2025-07-01

## 2025-07-01 RX ORDER — FENTANYL CITRATE 50 UG/ML
INJECTION, SOLUTION INTRAMUSCULAR; INTRAVENOUS PRN
Status: DISCONTINUED | OUTPATIENT
Start: 2025-07-01 | End: 2025-07-01

## 2025-07-01 RX ORDER — LIDOCAINE HYDROCHLORIDE 20 MG/ML
INJECTION, SOLUTION INFILTRATION; PERINEURAL PRN
Status: DISCONTINUED | OUTPATIENT
Start: 2025-07-01 | End: 2025-07-01

## 2025-07-01 RX ORDER — FENTANYL CITRATE 50 UG/ML
50 INJECTION, SOLUTION INTRAMUSCULAR; INTRAVENOUS EVERY 5 MIN PRN
Status: DISCONTINUED | OUTPATIENT
Start: 2025-07-01 | End: 2025-07-02 | Stop reason: HOSPADM

## 2025-07-01 RX ORDER — PROPOFOL 10 MG/ML
INJECTION, EMULSION INTRAVENOUS CONTINUOUS PRN
Status: DISCONTINUED | OUTPATIENT
Start: 2025-07-01 | End: 2025-07-01

## 2025-07-01 RX ORDER — DIPHENHYDRAMINE HYDROCHLORIDE 50 MG/ML
INJECTION, SOLUTION INTRAMUSCULAR; INTRAVENOUS PRN
Status: DISCONTINUED | OUTPATIENT
Start: 2025-07-01 | End: 2025-07-01

## 2025-07-01 RX ADMIN — SODIUM CHLORIDE, SODIUM LACTATE, POTASSIUM CHLORIDE, CALCIUM CHLORIDE: 600; 310; 30; 20 INJECTION, SOLUTION INTRAVENOUS at 13:04

## 2025-07-01 RX ADMIN — SODIUM CHLORIDE, SODIUM LACTATE, POTASSIUM CHLORIDE, CALCIUM CHLORIDE: 600; 310; 30; 20 INJECTION, SOLUTION INTRAVENOUS at 12:20

## 2025-07-01 RX ADMIN — PROPOFOL 300 MG: 10 INJECTION, EMULSION INTRAVENOUS at 13:09

## 2025-07-01 RX ADMIN — PROPOFOL 150 MCG/KG/MIN: 10 INJECTION, EMULSION INTRAVENOUS at 13:09

## 2025-07-01 RX ADMIN — DEXAMETHASONE SODIUM PHOSPHATE 4 MG: 4 INJECTION, SOLUTION INTRA-ARTICULAR; INTRALESIONAL; INTRAMUSCULAR; INTRAVENOUS; SOFT TISSUE at 13:17

## 2025-07-01 RX ADMIN — FENTANYL CITRATE 50 MCG: 50 INJECTION, SOLUTION INTRAMUSCULAR; INTRAVENOUS at 13:17

## 2025-07-01 RX ADMIN — DIPHENHYDRAMINE HYDROCHLORIDE 25 MG: 50 INJECTION, SOLUTION INTRAMUSCULAR; INTRAVENOUS at 14:01

## 2025-07-01 RX ADMIN — LIDOCAINE HYDROCHLORIDE 60 MG: 20 INJECTION, SOLUTION INFILTRATION; PERINEURAL at 13:09

## 2025-07-01 RX ADMIN — FENTANYL CITRATE 25 MCG: 50 INJECTION, SOLUTION INTRAMUSCULAR; INTRAVENOUS at 13:09

## 2025-07-01 RX ADMIN — KETOROLAC TROMETHAMINE 30 MG: 30 INJECTION, SOLUTION INTRAMUSCULAR; INTRAVENOUS at 13:46

## 2025-07-01 RX ADMIN — ONDANSETRON 4 MG: 2 INJECTION INTRAMUSCULAR; INTRAVENOUS at 13:28

## 2025-07-01 RX ADMIN — FENTANYL CITRATE 25 MCG: 50 INJECTION, SOLUTION INTRAMUSCULAR; INTRAVENOUS at 13:22

## 2025-07-01 RX ADMIN — ACETAMINOPHEN 975 MG: 325 TABLET ORAL at 12:20

## 2025-07-01 NOTE — ANESTHESIA PROCEDURE NOTES
Airway       Patient location during procedure: OR  Staff -        CRNA: Diamante Steinberg APRN CRNA       Performed By: CRNAIndications and Patient Condition       Indications for airway management: leslie-procedural       Induction type:intravenous       Mask difficulty assessment: 1 - vent by mask    Final Airway Details       Final airway type: supraglottic airway    Supraglottic Airway Details        Type: LMA       Brand: LMA Unique       LMA size: 4    Post intubation assessment        Placement verified by: capnometry and equal breath sounds        Number of attempts at approach: 1       Number of other approaches attempted: 0       Secured with: tape       Ease of procedure: easy       Dentition: Intact and Unchanged

## 2025-07-01 NOTE — DISCHARGE INSTRUCTIONS
[facility]  Home Care Following Anesthesia  For 24 hours after surgery:  Get plenty of rest.  A responsible adult must stay with you for at least 24 hours after you leave the surgery center.  Do not drive or use heavy equipment.  If you have weakness or tingling, don't drive or use heavy equipment until this feeling goes away.   Do not drink alcohol.   Avoid strenuous or risky activities.  Ask for help when climbing stairs.  You may feel lightheaded.  IF so, sit for a few minutes before standing.  Have someone help you get up.   If you have nausea (feel sick to your stomach): Drink only clear liquids such as apple juice, ginger ale, broth or 7-Up.  Rest may also help.  Be sure to drink enough fluids.  Move to a regular diet as you feel able.   You may have a slight fever.  Call the doctor if your fever is over 100 F (37.7 C) (taken under the tongue) or lasts longer than 24 hours.  You may have a dry mouth, a sore throat, muscle aches or trouble sleeping. These should go away after 24 hours.  Do not make important or legal decisions.   It is recommended to avoid smoking.               Tips for taking pain medications  To get the best pain relief possible, remember these points:  Take pain medications as directed, before pain becomes severe.  Pain medication can upset your stomach: taking it with food may help.  Constipation is a common side effect of pain medication. Drink plenty of  fluids.  Eat foods high in fiber. Take a stool softener if recommended by your doctor or pharmacist.  Do not drink alcohol, drive or operate machinery while taking pain medications.  Ask about other ways to control pain, such as with heat, ice or relaxation.    Tylenol/Acetaminophen Consumption    If you feel your pain relief is insufficient, you may take Tylenol/Acetaminophen in addition to your narcotic pain medication.   Be careful not to exceed 4,000 mg of Tylenol/Acetaminophen in a 24 hour  period from all sources.  If you are taking extra strength Tylenol/acetaminophen (500 mg), the maximum dose is 8 tablets in 24 hours.  If you are taking regular strength acetaminophen (325 mg), the maximum dose is 12 tablets in 24 hours.    Call a doctor for any of the following:  Signs of infection (fever, growing tenderness at the surgery site, a large amount of drainage or bleeding, severe pain, foul-smelling drainage, redness, swelling).  It has been over 8 to 10 hours since surgery and you are still not able to urinate (pass water).  Headache for over 24 hours.  Numbness, tingling or weakness the day after surgery (if you had spinal anesthesia).  Signs of Covid-19 infection (temperature over 100 degrees, shortness of breath, cough, loss of taste/smell, generalized body aches, persistent headache, chills, sore throat, nausea/vomiting/diarrhea)    Your doctor is:       Dr. Christin Vargas, Prostate and Urology: [phone]               After hours and weekends call the hospital @ [phone] and ask for the resident on call for:  Prostate Urology  For emergency care, call the:  [facility]:  [phone] (TTY for hearing impaired: [phone])

## 2025-07-01 NOTE — ANESTHESIA CARE TRANSFER NOTE
Patient: Latasha Slaughter    Procedure: Procedure(s):  bilateral simple scrotal orchiectomy       Diagnosis: Gender dysphoria [F64.9]  Diagnosis Additional Information: No value filed.    Anesthesia Type:   General     Note:    Oropharynx: oropharynx clear of all foreign objects and spontaneously breathing  Level of Consciousness: awake  Oxygen Supplementation: face mask  Level of Supplemental Oxygen (L/min / FiO2): 6  Independent Airway: airway patency satisfactory and stable  Dentition: dentition unchanged  Vital Signs Stable: post-procedure vital signs reviewed and stable  Report to RN Given: handoff report given  Patient transferred to: PACU    Handoff Report: Identifed the Patient, Identified the Reponsible Provider, Reviewed the pertinent medical history, Discussed the surgical course, Reviewed Intra-OP anesthesia mangement and issues during anesthesia, Set expectations for post-procedure period and Allowed opportunity for questions and acknowledgement of understanding      Vitals:  Vitals Value Taken Time   BP     Temp     Pulse     Resp     SpO2         Electronically Signed By: TOD Valencia CRNA  July 1, 2025  2:08 PM

## 2025-07-01 NOTE — ANESTHESIA PREPROCEDURE EVALUATION
Anesthesia Pre-Procedure Evaluation    Patient: Latasha Slaughter   MRN: [id number] : 1990          Procedure : Procedure(s):  bilateral simple scrotal orchiectomy         Past Medical History:   Diagnosis Date    ADHD (attention deficit hyperactivity disorder)     Allergic rhinitis, cause unspecified     Bipolar 2 disorder (H)     Gastroesophageal reflux disease without esophagitis     Headache(784.0)     Hormone replacement therapy     OCD (obsessive compulsive disorder)     Unspecified adjustment reaction       Past Surgical History:   Procedure Laterality Date    HERNIA REPAIR  as an infant    mole removed  2024    precancerous, clear margins      Allergies   Allergen Reactions    Amoxicillin     Azithromycin Hives    Biaxin [Clarithromycin]     Erythromycin       Social History     Tobacco Use    Smoking status: Never     Passive exposure: Never    Smokeless tobacco: Never   Substance Use Topics    Alcohol use: No      Wt Readings from Last 1 Encounters:   25 102.5 kg (226 lb)        Anesthesia Evaluation            ROS/MED HX  ENT/Pulmonary:       Neurologic:       Cardiovascular:       METS/Exercise Tolerance:     Hematologic:       Musculoskeletal:       GI/Hepatic:     (+) GERD,                   Renal/Genitourinary:       Endo:       Psychiatric/Substance Use:     (+) psychiatric history        Infectious Disease:       Malignancy:       Other:              Physical Exam  Airway  Mallampati: II    Cardiovascular   Rhythm: regular     Dental   (+) Minor Abnormalities - some fillings, tiny chips      Pulmonary       Neurological   Other Findings       OUTSIDE LABS:  CBC:   Lab Results   Component Value Date    WBC 12.0 (H) 2016    WBC 8.7 08/15/2014    HGB 12.7 2016    HGB 15.5 08/15/2014    HCT 37.7 2016    HCT 45.0 08/15/2014     2016     08/15/2014     BMP:   Lab Results   Component Value Date     2016     08/15/2014    POTASSIUM 3.3  "(L) 03/29/2016    POTASSIUM 4.7 08/15/2014    CHLORIDE 102 03/29/2016    CHLORIDE 102 08/15/2014    CO2 23 03/29/2016    CO2 29 08/15/2014    BUN 12 03/29/2016    BUN 14 08/15/2014    CR 0.91 03/29/2016    CR 0.89 08/15/2014     (H) 03/29/2016    GLC 88 08/15/2014     COAGS: No results found for: \"PTT\", \"INR\", \"FIBR\"  POC: No results found for: \"BGM\", \"HCG\", \"HCGS\"  HEPATIC:   Lab Results   Component Value Date    ALBUMIN 3.4 03/29/2016    PROTTOTAL 6.3 (L) 03/29/2016    ALT 40 03/29/2016    AST 11 03/29/2016    ALKPHOS 55 03/29/2016    BILITOTAL 0.2 03/29/2016     OTHER:   Lab Results   Component Value Date    BENIGNO 8.1 (L) 03/29/2016    LIPASE 74 03/29/2016    TSH 0.84 08/15/2014       Anesthesia Plan    ASA Status:  2      NPO Status: NPO Appropriate   Anesthesia Type: General.  Airway: supraglottic airway.  Induction: intravenous.  Maintenance: TIVA.   Techniques and Equipment:     - Airway:  Planned airway equipment includes supraglottic airway.     Consents    Anesthesia Plan(s) and associated risks, benefits, and realistic alternatives discussed. Questions answered and patient/representative(s) expressed understanding.     - Discussed:     - Discussed with:  Patient               Postoperative Care         Comments:                   Oliver Bang MD    I have reviewed the pertinent notes and labs in the chart from the past 30 days and (re)examined the patient.  Any updates or changes from those notes are reflected in this note.    Clinically Significant Risk Factors Present on Admission                             # Obesity: Estimated body mass index is 33.36 kg/m  as calculated from the following:    Height as of this encounter: 1.753 m (5' 9.02\").    Weight as of this encounter: 102.5 kg (226 lb).                    "

## 2025-07-01 NOTE — H&P
YES I have reviewed the patient s H&P against current condition and have identified no clinically significant changes in the patient s condition.   NO I have identified significant changes in the patient s medical condition and have discussed with surgeon.     Chrsitin Vargas MD

## 2025-07-01 NOTE — OP NOTE
PREOPERATIVE DIAGNOSIS:            Gender Dysphoria  POSTOPERATIVE DIAGNOSIS:                      Same    PROCEDURES PERFORMED:   bilateral simple scrotal orchiectomy    STAFF SURGEON:    Christin Vargas MD  RESIDENT(S):           Butch Raymundo MD  ANESTHESIA:           General- LMA    ESTIMATED BLOOD LOSS: 10 mL.   IV FLUIDS: see dictated anesthesia record  COMPLICATIONS: None.   SPECIMEN:    bilateral testicle and spermatic cord up to the level of the external ring     SIGNIFICANT FINDINGS:   Ligation of the cord at the level approximately of the external ring; testicle excised en bloc.    BRIEF OPERATIVE INDICATIONS:   Latasha Slaughter  is a 34 year old transgender female wishing to undergo bilateral orchiectomy for gender affirmation.     DESCRIPTION OF PROCEDURE: After full informed voluntary consent was obtained, the patient was transported to the operating room, placed supine on the table. After adequate anesthesia was induced, they were prepped and draped in the usual sterile fashion. A timeout was taken to confirm correct patient, procedure and laterality      We began the procedure by marking a 5 cm scrotal incision beginning at the penoscrotal junction at the midline raphe. Incision was made using a scalpel and electrocautery was used to carry dissection down through the dartos muscle . The right testicle was delivered into the wound. Tunica vaginalis was intact. Blunt and electrocautery dissection was continued proximally to mobilize the cord. The cord was dissected superiorly up to the level of the external ring. It was divided into two segments and separately clamped and divided. Each was then stick tied with 0 silk suture. The cord was then divided sharply. The testicle and spermatic cord were removed.  A cord block was done with 10 mL 0.5% Marcaine.  The procedure as stated above was then repeated on the left side.   Irrigation was performed and hemostasis was confirmed.      Injection of approximately  10cc 0.5% Marcaine was used for local anesthesia along the skin. The dartos was closed with a running 3-0 vicryl suture followed by the skin with running 4-0 monocryl suture in a running horizontal mattress fashion. Bacitracin was applied     Patient tolerated the procedure well. No apparent complications. She was transported to the postanesthesia care unit in stable condition    DISPO: PACU then home. She will follow up in clinic in 2-3 weeks for wound check.       Plan:  - Can discharge home once meeting criteria  - Follow-up with clinic as scheduled on 07/17

## 2025-07-01 NOTE — ANESTHESIA POSTPROCEDURE EVALUATION
Patient: Latasha Slaughter    Procedure: Procedure(s):  bilateral simple scrotal orchiectomy       Anesthesia Type:  General    Note:  Disposition: Outpatient   Postop Pain Control: Uneventful            Sign Out: Well controlled pain   PONV: No   Neuro/Psych: Uneventful            Sign Out: Acceptable/Baseline neuro status   Airway/Respiratory: Uneventful            Sign Out: Acceptable/Baseline resp. status   CV/Hemodynamics: Uneventful            Sign Out: Acceptable CV status; No obvious hypovolemia; No obvious fluid overload   Other NRE: NONE   DID A NON-ROUTINE EVENT OCCUR? No       Last vitals:  Vitals Value Taken Time   /77 07/01/25 14:20   Temp 36.7  C (98.1  F) 07/01/25 14:20   Pulse 61 07/01/25 14:20   Resp 21 07/01/25 14:20   SpO2 100 % 07/01/25 14:20       Electronically Signed By: Margaret Roy MD  July 1, 2025  3:16 PM

## 2025-07-02 ENCOUNTER — ANESTHESIA EVENT (OUTPATIENT)
Dept: SURGERY | Facility: CLINIC | Age: 35
End: 2025-07-02
Payer: COMMERCIAL

## 2025-07-02 ENCOUNTER — NURSE TRIAGE (OUTPATIENT)
Dept: NURSING | Facility: CLINIC | Age: 35
End: 2025-07-02
Payer: COMMERCIAL

## 2025-07-02 ENCOUNTER — ANESTHESIA (OUTPATIENT)
Dept: SURGERY | Facility: CLINIC | Age: 35
End: 2025-07-02
Payer: COMMERCIAL

## 2025-07-02 ENCOUNTER — HOSPITAL ENCOUNTER (EMERGENCY)
Facility: CLINIC | Age: 35
Discharge: HOME | End: 2025-07-02
Attending: EMERGENCY MEDICINE | Admitting: EMERGENCY MEDICINE
Payer: COMMERCIAL

## 2025-07-02 ENCOUNTER — TELEPHONE (OUTPATIENT)
Dept: UROLOGY | Facility: CLINIC | Age: 35
End: 2025-07-02
Payer: COMMERCIAL

## 2025-07-02 VITALS
RESPIRATION RATE: 15 BRPM | WEIGHT: 226 LBS | HEIGHT: 69 IN | DIASTOLIC BLOOD PRESSURE: 76 MMHG | TEMPERATURE: 97 F | SYSTOLIC BLOOD PRESSURE: 118 MMHG | OXYGEN SATURATION: 99 % | BODY MASS INDEX: 33.47 KG/M2 | HEART RATE: 71 BPM

## 2025-07-02 DIAGNOSIS — S30.22XA HEMATOMA OF SCROTUM: ICD-10-CM

## 2025-07-02 LAB
ABO + RH BLD: NORMAL
ANION GAP SERPL CALCULATED.3IONS-SCNC: 12 MMOL/L (ref 7–15)
BASOPHILS # BLD AUTO: 0 10E3/UL (ref 0–0.2)
BASOPHILS NFR BLD AUTO: 0 %
BLD GP AB SCN SERPL QL: NEGATIVE
BUN SERPL-MCNC: 11.8 MG/DL (ref 6–20)
CALCIUM SERPL-MCNC: 9.1 MG/DL (ref 8.8–10.4)
CHLORIDE SERPL-SCNC: 105 MMOL/L (ref 98–107)
CREAT SERPL-MCNC: 0.68 MG/DL (ref 0.51–1.17)
EGFRCR SERPLBLD CKD-EPI 2021: >90 ML/MIN/1.73M2
EOSINOPHIL # BLD AUTO: 0.1 10E3/UL (ref 0–0.7)
EOSINOPHIL NFR BLD AUTO: 1 %
ERYTHROCYTE [DISTWIDTH] IN BLOOD BY AUTOMATED COUNT: 12.6 % (ref 10–15)
GLUCOSE SERPL-MCNC: 151 MG/DL (ref 70–99)
HCO3 SERPL-SCNC: 19 MMOL/L (ref 22–29)
HCT VFR BLD AUTO: 34.3 % (ref 35–53)
HGB BLD-MCNC: 11.9 G/DL (ref 11.7–17.7)
IMM GRANULOCYTES # BLD: 0.2 10E3/UL
IMM GRANULOCYTES NFR BLD: 1 %
LYMPHOCYTES # BLD AUTO: 2.5 10E3/UL (ref 0.8–5.3)
LYMPHOCYTES NFR BLD AUTO: 16 %
MCH RBC QN AUTO: 30.7 PG (ref 26.5–33)
MCHC RBC AUTO-ENTMCNC: 34.7 G/DL (ref 31.5–36.5)
MCV RBC AUTO: 88 FL (ref 78–100)
MONOCYTES # BLD AUTO: 1.3 10E3/UL (ref 0–1.3)
MONOCYTES NFR BLD AUTO: 8 %
NEUTROPHILS # BLD AUTO: 11.5 10E3/UL (ref 1.6–8.3)
NEUTROPHILS NFR BLD AUTO: 74 %
NRBC # BLD AUTO: 0 10E3/UL
NRBC BLD AUTO-RTO: 0 /100
PLATELET # BLD AUTO: 404 10E3/UL (ref 150–450)
POTASSIUM SERPL-SCNC: 4.1 MMOL/L (ref 3.4–5.3)
RBC # BLD AUTO: 3.88 10E6/UL (ref 3.8–5.9)
SODIUM SERPL-SCNC: 136 MMOL/L (ref 135–145)
SPECIMEN EXP DATE BLD: NORMAL
WBC # BLD AUTO: 15.6 10E3/UL (ref 4–11)

## 2025-07-02 PROCEDURE — 80048 BASIC METABOLIC PNL TOTAL CA: CPT | Performed by: EMERGENCY MEDICINE

## 2025-07-02 PROCEDURE — 85025 COMPLETE CBC W/AUTO DIFF WBC: CPT | Performed by: EMERGENCY MEDICINE

## 2025-07-02 PROCEDURE — 250N000011 HC RX IP 250 OP 636: Performed by: STUDENT IN AN ORGANIZED HEALTH CARE EDUCATION/TRAINING PROGRAM

## 2025-07-02 PROCEDURE — 96374 THER/PROPH/DIAG INJ IV PUSH: CPT

## 2025-07-02 PROCEDURE — 710N000009 HC RECOVERY PHASE 1, LEVEL 1, PER MIN: Performed by: STUDENT IN AN ORGANIZED HEALTH CARE EDUCATION/TRAINING PROGRAM

## 2025-07-02 PROCEDURE — 370N000017 HC ANESTHESIA TECHNICAL FEE, PER MIN: Performed by: STUDENT IN AN ORGANIZED HEALTH CARE EDUCATION/TRAINING PROGRAM

## 2025-07-02 PROCEDURE — 250N000011 HC RX IP 250 OP 636

## 2025-07-02 PROCEDURE — 250N000025 HC SEVOFLURANE, PER MIN: Performed by: STUDENT IN AN ORGANIZED HEALTH CARE EDUCATION/TRAINING PROGRAM

## 2025-07-02 PROCEDURE — 10140 I&D HMTMA SEROMA/FLUID COLLJ: CPT | Mod: 78 | Performed by: STUDENT IN AN ORGANIZED HEALTH CARE EDUCATION/TRAINING PROGRAM

## 2025-07-02 PROCEDURE — 250N000013 HC RX MED GY IP 250 OP 250 PS 637: Performed by: ANESTHESIOLOGY

## 2025-07-02 PROCEDURE — 96361 HYDRATE IV INFUSION ADD-ON: CPT

## 2025-07-02 PROCEDURE — 258N000003 HC RX IP 258 OP 636

## 2025-07-02 PROCEDURE — 99024 POSTOP FOLLOW-UP VISIT: CPT | Mod: 25 | Performed by: STUDENT IN AN ORGANIZED HEALTH CARE EDUCATION/TRAINING PROGRAM

## 2025-07-02 PROCEDURE — 999N000141 HC STATISTIC PRE-PROCEDURE NURSING ASSESSMENT: Performed by: STUDENT IN AN ORGANIZED HEALTH CARE EDUCATION/TRAINING PROGRAM

## 2025-07-02 PROCEDURE — 250N000009 HC RX 250

## 2025-07-02 PROCEDURE — 82374 ASSAY BLOOD CARBON DIOXIDE: CPT | Performed by: EMERGENCY MEDICINE

## 2025-07-02 PROCEDURE — 86901 BLOOD TYPING SEROLOGIC RH(D): CPT | Performed by: EMERGENCY MEDICINE

## 2025-07-02 PROCEDURE — 250N000013 HC RX MED GY IP 250 OP 250 PS 637: Performed by: STUDENT IN AN ORGANIZED HEALTH CARE EDUCATION/TRAINING PROGRAM

## 2025-07-02 PROCEDURE — 360N000075 HC SURGERY LEVEL 2, PER MIN: Performed by: STUDENT IN AN ORGANIZED HEALTH CARE EDUCATION/TRAINING PROGRAM

## 2025-07-02 PROCEDURE — 86900 BLOOD TYPING SEROLOGIC ABO: CPT | Performed by: EMERGENCY MEDICINE

## 2025-07-02 PROCEDURE — 258N000003 HC RX IP 258 OP 636: Performed by: EMERGENCY MEDICINE

## 2025-07-02 PROCEDURE — 250N000011 HC RX IP 250 OP 636: Performed by: ANESTHESIOLOGY

## 2025-07-02 PROCEDURE — 258N000003 HC RX IP 258 OP 636: Performed by: STUDENT IN AN ORGANIZED HEALTH CARE EDUCATION/TRAINING PROGRAM

## 2025-07-02 PROCEDURE — 250N000011 HC RX IP 250 OP 636: Performed by: EMERGENCY MEDICINE

## 2025-07-02 PROCEDURE — 710N000012 HC RECOVERY PHASE 2, PER MINUTE: Performed by: STUDENT IN AN ORGANIZED HEALTH CARE EDUCATION/TRAINING PROGRAM

## 2025-07-02 PROCEDURE — 36415 COLL VENOUS BLD VENIPUNCTURE: CPT | Performed by: EMERGENCY MEDICINE

## 2025-07-02 PROCEDURE — 96376 TX/PRO/DX INJ SAME DRUG ADON: CPT

## 2025-07-02 PROCEDURE — 99285 EMERGENCY DEPT VISIT HI MDM: CPT | Mod: 25

## 2025-07-02 PROCEDURE — 250N000009 HC RX 250: Performed by: STUDENT IN AN ORGANIZED HEALTH CARE EDUCATION/TRAINING PROGRAM

## 2025-07-02 PROCEDURE — 85004 AUTOMATED DIFF WBC COUNT: CPT | Performed by: EMERGENCY MEDICINE

## 2025-07-02 PROCEDURE — 272N000001 HC OR GENERAL SUPPLY STERILE: Performed by: STUDENT IN AN ORGANIZED HEALTH CARE EDUCATION/TRAINING PROGRAM

## 2025-07-02 RX ORDER — MAGNESIUM HYDROXIDE 1200 MG/15ML
LIQUID ORAL PRN
Status: DISCONTINUED | OUTPATIENT
Start: 2025-07-02 | End: 2025-07-02 | Stop reason: HOSPADM

## 2025-07-02 RX ORDER — FENTANYL CITRATE 0.05 MG/ML
25 INJECTION, SOLUTION INTRAMUSCULAR; INTRAVENOUS EVERY 5 MIN PRN
Status: DISCONTINUED | OUTPATIENT
Start: 2025-07-02 | End: 2025-07-02 | Stop reason: HOSPADM

## 2025-07-02 RX ORDER — OXYCODONE HYDROCHLORIDE 5 MG/1
5 TABLET ORAL ONCE
Refills: 0 | Status: COMPLETED | OUTPATIENT
Start: 2025-07-02 | End: 2025-07-02

## 2025-07-02 RX ORDER — HYDROMORPHONE HYDROCHLORIDE 1 MG/ML
0.5 INJECTION, SOLUTION INTRAMUSCULAR; INTRAVENOUS; SUBCUTANEOUS
Refills: 0 | Status: COMPLETED | OUTPATIENT
Start: 2025-07-02 | End: 2025-07-02

## 2025-07-02 RX ORDER — BUPIVACAINE HYDROCHLORIDE 2.5 MG/ML
INJECTION, SOLUTION INFILTRATION; PERINEURAL PRN
Status: DISCONTINUED | OUTPATIENT
Start: 2025-07-02 | End: 2025-07-02 | Stop reason: HOSPADM

## 2025-07-02 RX ORDER — PROPOFOL 10 MG/ML
INJECTION, EMULSION INTRAVENOUS PRN
Status: DISCONTINUED | OUTPATIENT
Start: 2025-07-02 | End: 2025-07-02

## 2025-07-02 RX ORDER — ACETAMINOPHEN 650 MG/1
650 SUPPOSITORY RECTAL ONCE
Status: COMPLETED | OUTPATIENT
Start: 2025-07-02 | End: 2025-07-02

## 2025-07-02 RX ORDER — CEFAZOLIN SODIUM 2 G/50ML
2 SOLUTION INTRAVENOUS SEE ADMIN INSTRUCTIONS
Status: CANCELLED | OUTPATIENT
Start: 2025-07-02

## 2025-07-02 RX ORDER — SODIUM CHLORIDE, SODIUM LACTATE, POTASSIUM CHLORIDE, CALCIUM CHLORIDE 600; 310; 30; 20 MG/100ML; MG/100ML; MG/100ML; MG/100ML
INJECTION, SOLUTION INTRAVENOUS CONTINUOUS
Status: DISCONTINUED | OUTPATIENT
Start: 2025-07-02 | End: 2025-07-02 | Stop reason: HOSPADM

## 2025-07-02 RX ORDER — LIDOCAINE HYDROCHLORIDE 20 MG/ML
INJECTION, SOLUTION INFILTRATION; PERINEURAL PRN
Status: DISCONTINUED | OUTPATIENT
Start: 2025-07-02 | End: 2025-07-02

## 2025-07-02 RX ORDER — ONDANSETRON 2 MG/ML
4 INJECTION INTRAMUSCULAR; INTRAVENOUS EVERY 30 MIN PRN
Status: DISCONTINUED | OUTPATIENT
Start: 2025-07-02 | End: 2025-07-02 | Stop reason: HOSPADM

## 2025-07-02 RX ORDER — CEFAZOLIN SODIUM/WATER 2 G/20 ML
2 SYRINGE (ML) INTRAVENOUS SEE ADMIN INSTRUCTIONS
Status: DISCONTINUED | OUTPATIENT
Start: 2025-07-02 | End: 2025-07-02 | Stop reason: HOSPADM

## 2025-07-02 RX ORDER — CEFAZOLIN SODIUM 2 G/50ML
2 SOLUTION INTRAVENOUS
Status: CANCELLED | OUTPATIENT
Start: 2025-07-02

## 2025-07-02 RX ORDER — OXYCODONE HYDROCHLORIDE 5 MG/1
5 TABLET ORAL EVERY 6 HOURS PRN
Qty: 12 TABLET | Refills: 0 | Status: SHIPPED | OUTPATIENT
Start: 2025-07-02

## 2025-07-02 RX ORDER — ONDANSETRON 2 MG/ML
INJECTION INTRAMUSCULAR; INTRAVENOUS PRN
Status: DISCONTINUED | OUTPATIENT
Start: 2025-07-02 | End: 2025-07-02

## 2025-07-02 RX ORDER — FENTANYL CITRATE 0.05 MG/ML
50 INJECTION, SOLUTION INTRAMUSCULAR; INTRAVENOUS EVERY 5 MIN PRN
Status: DISCONTINUED | OUTPATIENT
Start: 2025-07-02 | End: 2025-07-02 | Stop reason: HOSPADM

## 2025-07-02 RX ORDER — HYDROMORPHONE HCL IN WATER/PF 6 MG/30 ML
0.4 PATIENT CONTROLLED ANALGESIA SYRINGE INTRAVENOUS EVERY 5 MIN PRN
Status: DISCONTINUED | OUTPATIENT
Start: 2025-07-02 | End: 2025-07-02 | Stop reason: HOSPADM

## 2025-07-02 RX ORDER — GINSENG 100 MG
CAPSULE ORAL PRN
Status: DISCONTINUED | OUTPATIENT
Start: 2025-07-02 | End: 2025-07-02 | Stop reason: HOSPADM

## 2025-07-02 RX ORDER — SODIUM CHLORIDE 9 MG/ML
INJECTION, SOLUTION INTRAVENOUS CONTINUOUS
Status: DISCONTINUED | OUTPATIENT
Start: 2025-07-02 | End: 2025-07-02 | Stop reason: HOSPADM

## 2025-07-02 RX ORDER — ACETAMINOPHEN 325 MG/1
975 TABLET ORAL ONCE
Status: COMPLETED | OUTPATIENT
Start: 2025-07-02 | End: 2025-07-02

## 2025-07-02 RX ORDER — FENTANYL CITRATE 50 UG/ML
INJECTION, SOLUTION INTRAMUSCULAR; INTRAVENOUS PRN
Status: DISCONTINUED | OUTPATIENT
Start: 2025-07-02 | End: 2025-07-02

## 2025-07-02 RX ORDER — DEXAMETHASONE SODIUM PHOSPHATE 4 MG/ML
4 INJECTION, SOLUTION INTRA-ARTICULAR; INTRALESIONAL; INTRAMUSCULAR; INTRAVENOUS; SOFT TISSUE
Status: DISCONTINUED | OUTPATIENT
Start: 2025-07-02 | End: 2025-07-02 | Stop reason: HOSPADM

## 2025-07-02 RX ORDER — DIPHENHYDRAMINE HYDROCHLORIDE 50 MG/ML
INJECTION, SOLUTION INTRAMUSCULAR; INTRAVENOUS PRN
Status: DISCONTINUED | OUTPATIENT
Start: 2025-07-02 | End: 2025-07-02

## 2025-07-02 RX ORDER — ONDANSETRON 4 MG/1
4 TABLET, ORALLY DISINTEGRATING ORAL EVERY 30 MIN PRN
Status: DISCONTINUED | OUTPATIENT
Start: 2025-07-02 | End: 2025-07-02 | Stop reason: HOSPADM

## 2025-07-02 RX ORDER — ACETAMINOPHEN 650 MG/1
650 SUPPOSITORY RECTAL ONCE
Status: CANCELLED | OUTPATIENT
Start: 2025-07-02

## 2025-07-02 RX ORDER — DEXAMETHASONE SODIUM PHOSPHATE 4 MG/ML
INJECTION, SOLUTION INTRA-ARTICULAR; INTRALESIONAL; INTRAMUSCULAR; INTRAVENOUS; SOFT TISSUE PRN
Status: DISCONTINUED | OUTPATIENT
Start: 2025-07-02 | End: 2025-07-02

## 2025-07-02 RX ORDER — CEFAZOLIN SODIUM/WATER 2 G/20 ML
2 SYRINGE (ML) INTRAVENOUS
Status: COMPLETED | OUTPATIENT
Start: 2025-07-02 | End: 2025-07-02

## 2025-07-02 RX ORDER — NALOXONE HYDROCHLORIDE 0.4 MG/ML
0.1 INJECTION, SOLUTION INTRAMUSCULAR; INTRAVENOUS; SUBCUTANEOUS
Status: DISCONTINUED | OUTPATIENT
Start: 2025-07-02 | End: 2025-07-02 | Stop reason: HOSPADM

## 2025-07-02 RX ORDER — OXYCODONE HYDROCHLORIDE 5 MG/1
5 TABLET ORAL EVERY 6 HOURS PRN
Qty: 12 TABLET | Refills: 0 | Status: SHIPPED | OUTPATIENT
Start: 2025-07-02 | End: 2025-07-02

## 2025-07-02 RX ORDER — LIDOCAINE 40 MG/G
CREAM TOPICAL
Status: DISCONTINUED | OUTPATIENT
Start: 2025-07-02 | End: 2025-07-02 | Stop reason: HOSPADM

## 2025-07-02 RX ORDER — ACETAMINOPHEN 325 MG/1
975 TABLET ORAL ONCE
Status: CANCELLED | OUTPATIENT
Start: 2025-07-02 | End: 2025-07-02

## 2025-07-02 RX ORDER — HYDROMORPHONE HCL IN WATER/PF 6 MG/30 ML
0.2 PATIENT CONTROLLED ANALGESIA SYRINGE INTRAVENOUS EVERY 5 MIN PRN
Status: DISCONTINUED | OUTPATIENT
Start: 2025-07-02 | End: 2025-07-02 | Stop reason: HOSPADM

## 2025-07-02 RX ADMIN — HYDROMORPHONE HYDROCHLORIDE 0.5 MG: 1 INJECTION, SOLUTION INTRAMUSCULAR; INTRAVENOUS; SUBCUTANEOUS at 15:17

## 2025-07-02 RX ADMIN — FENTANYL CITRATE 50 MCG: 50 INJECTION INTRAMUSCULAR; INTRAVENOUS at 18:44

## 2025-07-02 RX ADMIN — FENTANYL CITRATE 50 MCG: 50 INJECTION INTRAMUSCULAR; INTRAVENOUS at 18:27

## 2025-07-02 RX ADMIN — DEXMEDETOMIDINE HYDROCHLORIDE 20 MCG: 100 INJECTION, SOLUTION INTRAVENOUS at 19:43

## 2025-07-02 RX ADMIN — HYDROMORPHONE HYDROCHLORIDE 0.5 MG: 1 INJECTION, SOLUTION INTRAMUSCULAR; INTRAVENOUS; SUBCUTANEOUS at 14:21

## 2025-07-02 RX ADMIN — PROPOFOL 200 MG: 10 INJECTION, EMULSION INTRAVENOUS at 18:27

## 2025-07-02 RX ADMIN — HYDROMORPHONE HYDROCHLORIDE 0.4 MG: 0.2 INJECTION, SOLUTION INTRAMUSCULAR; INTRAVENOUS; SUBCUTANEOUS at 20:55

## 2025-07-02 RX ADMIN — LIDOCAINE HYDROCHLORIDE 100 MG: 20 INJECTION, SOLUTION INFILTRATION; PERINEURAL at 18:27

## 2025-07-02 RX ADMIN — FENTANYL CITRATE 50 MCG: 50 INJECTION INTRAMUSCULAR; INTRAVENOUS at 20:31

## 2025-07-02 RX ADMIN — MIDAZOLAM 2 MG: 1 INJECTION INTRAMUSCULAR; INTRAVENOUS at 18:22

## 2025-07-02 RX ADMIN — DIPHENHYDRAMINE HYDROCHLORIDE 25 MG: 50 INJECTION, SOLUTION INTRAMUSCULAR; INTRAVENOUS at 18:28

## 2025-07-02 RX ADMIN — Medication 2 G: at 18:21

## 2025-07-02 RX ADMIN — SODIUM CHLORIDE: 0.9 INJECTION, SOLUTION INTRAVENOUS at 14:22

## 2025-07-02 RX ADMIN — SODIUM CHLORIDE, SODIUM LACTATE, POTASSIUM CHLORIDE, AND CALCIUM CHLORIDE: .6; .31; .03; .02 INJECTION, SOLUTION INTRAVENOUS at 17:52

## 2025-07-02 RX ADMIN — ONDANSETRON 4 MG: 2 INJECTION INTRAMUSCULAR; INTRAVENOUS at 18:27

## 2025-07-02 RX ADMIN — FENTANYL CITRATE 50 MCG: 50 INJECTION INTRAMUSCULAR; INTRAVENOUS at 20:18

## 2025-07-02 RX ADMIN — HYDROMORPHONE HYDROCHLORIDE 0.5 MG: 1 INJECTION, SOLUTION INTRAMUSCULAR; INTRAVENOUS; SUBCUTANEOUS at 19:43

## 2025-07-02 RX ADMIN — ACETAMINOPHEN 975 MG: 325 TABLET, FILM COATED ORAL at 17:52

## 2025-07-02 RX ADMIN — OXYCODONE HYDROCHLORIDE 5 MG: 5 TABLET ORAL at 21:07

## 2025-07-02 RX ADMIN — DEXAMETHASONE SODIUM PHOSPHATE 4 MG: 4 INJECTION, SOLUTION INTRA-ARTICULAR; INTRALESIONAL; INTRAMUSCULAR; INTRAVENOUS; SOFT TISSUE at 18:27

## 2025-07-02 ASSESSMENT — ACTIVITIES OF DAILY LIVING (ADL)
ADLS_ACUITY_SCORE: 41
ADLS_ACUITY_SCORE: 41
ADLS_ACUITY_SCORE: 42
ADLS_ACUITY_SCORE: 41
ADLS_ACUITY_SCORE: 42
ADLS_ACUITY_SCORE: 41
ADLS_ACUITY_SCORE: 41
ADLS_ACUITY_SCORE: 42

## 2025-07-02 ASSESSMENT — COLUMBIA-SUICIDE SEVERITY RATING SCALE - C-SSRS
1. IN THE PAST MONTH, HAVE YOU WISHED YOU WERE DEAD OR WISHED YOU COULD GO TO SLEEP AND NOT WAKE UP?: NO
2. HAVE YOU ACTUALLY HAD ANY THOUGHTS OF KILLING YOURSELF IN THE PAST MONTH?: NO
6. HAVE YOU EVER DONE ANYTHING, STARTED TO DO ANYTHING, OR PREPARED TO DO ANYTHING TO END YOUR LIFE?: NO

## 2025-07-02 ASSESSMENT — COPD QUESTIONNAIRES: COPD: 0

## 2025-07-02 ASSESSMENT — LIFESTYLE VARIABLES: TOBACCO_USE: 0

## 2025-07-02 NOTE — H&P
Urology History and Physical    Name: Latasha Slaughter    MRN: [id number]   YOB: 1990              History of Present Illness:   Latasha Slaughter is a 34 year old trans female patient who underwent gender-affirming bilateral orchiectomy with Dr. Vargas on 7/1/25. Subsequently developed significant scrotal swelling and bruising overnight, accompanied by pain and chills. No fevers at home. Denies nausea/vomiting. Able to urinate spontaneously, but also has some swelling of the foreskin, which makes voiding more difficult. Small amount of bleeding from incision.     Last PO 9AM. No blood thinners.            Past Medical History:     Past Medical History:   Diagnosis Date    ADHD (attention deficit hyperactivity disorder)     Allergic rhinitis, cause unspecified     Bipolar 2 disorder (H)     Gastroesophageal reflux disease without esophagitis     Headache(784.0)     Hormone replacement therapy     OCD (obsessive compulsive disorder)     Unspecified adjustment reaction             Past Surgical History:     Past Surgical History:   Procedure Laterality Date    HERNIA REPAIR  as an infant    mole removed  12/2024    precancerous, clear margins    ORCHIECTOMY SCROTAL Bilateral 7/1/2025    Procedure: bilateral simple scrotal orchiectomy;  Surgeon: Christin Vargas MD;  [place] [facility] [place]          Social History:     Social History     Tobacco Use    Smoking status: Never     Passive exposure: Never    Smokeless tobacco: Never   Substance Use Topics    Alcohol use: No            Family History:     Family History   Problem Relation Age of Onset    Alcohol/Drug Father         recovered    Depression Mother         anxiety    Depression Father         anxiety    Depression Sister         social anxiety    Depression Maternal Grandmother     Depression Paternal Grandfather     Alcohol/Drug Paternal Grandfather     C.A.D. Paternal Grandmother     Diabetes Paternal Grandmother     Obesity Paternal Grandmother      "Obesity Paternal Grandfather     Cancer No family hx of               Allergies:     Allergies   Allergen Reactions    Amoxicillin     Azithromycin Hives    Biaxin [Clarithromycin]     Erythromycin             Medications:     Current Facility-Administered Medications   Medication Dose Route Frequency Provider Last Rate Last Admin    sodium chloride 0.9 % infusion   Intravenous Continuous Rehana Chowdhury  mL/hr at 07/02/25 1422 New Bag at 07/02/25 1422             Review of Systems:    ROS: 10 point ROS neg other than the symptoms noted above in the HPI.          Physical Exam:   Patient Vitals for the past 24 hrs:   BP Temp Temp src Pulse Resp SpO2 Height Weight   07/02/25 1642 -- -- -- -- -- -- 1.753 m (5' 9\") 102.5 kg (226 lb)   07/02/25 1638 -- 96.8  F (36  C) Temporal -- -- -- -- --   07/02/25 1503 118/80 -- -- 69 -- 100 % -- --   07/02/25 1428 127/77 -- -- 77 18 99 % -- --   07/02/25 1142 125/82 98.2  F (36.8  C) -- 79 16 99 % -- --     General: age-appropriate appearing adult in NAD  HEENT: Head AT/NC, EOMI, CN Grossly intact  Lungs: no respiratory distress, or pursed lip breathing  Heart: No obvious jugular venous distension present  Abdomen: soft, non-distended, non-tender. No organomegaly  : Uncircumcised penis with foreskin swelling around glans. Large scrotal hematoma present with small amount of oozing from midline scrotal incision, tender, firm.    Neuro: Alert, oriented, speech and mentation normal; moving all 4 extremities equally.  Psych: Affect and mood normal          Data:   All laboratory data reviewed:    Recent Labs   Lab 07/02/25  1147   WBC 15.6*   HGB 11.9          Recent Labs   Lab 07/02/25  1147      POTASSIUM 4.1   CHLORIDE 105   CO2 19*   BUN 11.8   CR 0.68   *   BENIGNO 9.1     All pertinent imaging reviewed.         Impression and Plan:   Impression:   34yoTF who is POD#1 bilateral orchiectomy, presenting with large scrotal hematoma. Reviewed options " including observation with pain control versus washout, discussed risks and benefits of each. Plan to go to OR for scrotal washout, possible drain placement.      Plan:   - Keep NPO  - Preop orders placed  - To OR for scrotal washout      Patient discussed with staff surgeon, Dr. Vargas.     Ana Acevedo MD  Urology Resident, PGY-5

## 2025-07-02 NOTE — TELEPHONE ENCOUNTER
"Nurse Triage SBAR    Is this a 2nd Level Triage? YES, LICENSED PRACTITIONER REVIEW IS REQUIRED    Situation: Had bilateral scrotal orchiectomy yesterday. Today testicles are swollen to size of baseball, foreskin also swollen    Background:   Procedure Laterality Anesthesia   bilateral simple scrotal orchiectomy     7/1/25    Assessment: Patient calling today to report swelling to size of baseball on testicles, also swelling to foreskin which is making it \"difficult to void\" was able to void but everything is so swollen . Afebrile. Hard to visualize area but thinks there is drainage from incision. Pain is \"6-7\". Did just void, no pain with urination.     Protocol Recommended Disposition:   Go To ED/UCC Now (Or To Office With PCP Approval)    Recommendation: Please call regarding advice/appt?     Routed to provider    Does the patient meet one of the following criteria for ADS visit consideration? No      Reason for Disposition   Sounds like a serious complication to the triager    Additional Information   Negative: Sounds like a life-threatening emergency to the triager   Negative: Chest pain   Negative: Difficulty breathing   Negative: Acting confused (e.g., disoriented, slurred speech) or excessively sleepy   Negative: Surgical incision symptoms and questions   Negative: Pain or burning with passing urine (urination) and male   Negative: Pain or burning with passing urine (urination) and female   Negative: Constipation   Negative: Leg (calf, thigh) pain of new-onset or getting worse   Negative: Leg swelling of new-onset or getting worse   Negative: Dizziness is severe, or persists > 24 hours after surgery   Negative: Pain, redness, swelling, or pus at IV site (current or recent)   Negative: Symptoms arising from use of a urinary catheter (Aggarwal or Coude)   Negative: Cast problems or questions   Negative: Medication question   Negative: Bright red, wide-spread, sunburn-like rash   Negative: SEVERE headache (e.g., " excruciating) and after spinal (epidural) anesthesia   Negative: Vomiting and persists > 4 hours   Negative: Vomiting and abdomen looks much more swollen than usual   Negative: Drinking very little and dehydration suspected (e.g., no urine > 12 hours, very dry mouth, very lightheaded)   Negative: Patient sounds very sick or weak to the triager    Protocols used: Post-Op Symptoms and [id number]  Concetta JIM RN  [facility]/Red Flag Triage & Med Refill Team

## 2025-07-02 NOTE — ANESTHESIA PROCEDURE NOTES
Airway       Patient location during procedure: OR  Staff -        CRNA: Noel Lerner APRN CRNA       Performed By: CRNA  Consent for Airway        Urgency: elective  Indications and Patient Condition       Indications for airway management: leslie-procedural       Induction type:intravenous       Mask difficulty assessment: 0 - not attempted    Final Airway Details       Final airway type: supraglottic airway    Supraglottic Airway Details        Type: LMA       Brand: I-Gel       LMA size: 5    Post intubation assessment        Placement verified by: capnometry, equal breath sounds and chest rise        Number of attempts at approach: 1       Number of other approaches attempted: 0       Ease of procedure: easy       Dentition: Unchanged and Intact

## 2025-07-02 NOTE — ED PROVIDER NOTES
Emergency Department Note      History of Present Illness     Chief Complaint   Post-op Problem      HPI   Latasha Slaughter is a 34 year old adult with history of gender dysphoria who presents to the ED for evaluation of a post-op problem. Patient had a bilateral scrotal orchiectomy yesterday for gender affirmation. There is a significant amount of swelling to the scrotum. She called her surgeon who is working in the [facility] OR today, and told the patient to go to the ED until they are able to evaluate him after 1500 this afternoon. Denies fever. She last ate at 0900 this morning.     Independent Historian   None    Review of External Notes   I have reviewed the operative note from yesterday from Dr. 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    Constitutional:  Appears mildly uncomfortable.  HENT:                Nose normal.  No discharge.      Oral mucosa is moist.  Cardiovascular:  Normal rate, regular rhythm with normal S1 and S2.      Normal heart sounds and peripheral pulses 2+ and equal.       No murmur or miguel.  Pulmonary:  Effort normal and breath sounds clear to auscultation bilaterally.    No respiratory distress.      GI:    Soft. No distension and no  mass. No tenderness.   :   Uncircumcised penis.  Scrotum reveals significant swelling about to the size of a large grapefruit or small cantaloupe.  There is a lot of bruising.  Is some oozing out the incision.  It is tender and firm.  Neurological:   Alert and oriented. No focal weakness.  Skin:    Skin is warm and dry.  Bruising and swelling to the genitals and scrotum.  Psychiatric:   Behavior is normal. Appropriate mood and affect.     Judgment and thought content normal.       Diagnostics     Lab Results   Labs Ordered and Resulted from Time of ED Arrival to Time of ED Departure   BASIC METABOLIC PANEL - Abnormal       Result Value    Sodium 136      Potassium 4.1      Chloride 105      Carbon Dioxide (CO2) 19 (*)     Anion Gap 12      Urea Nitrogen 11.8      Creatinine 0.68      GFR Estimate >90      Calcium 9.1      Glucose 151 (*)    CBC WITH PLATELETS AND DIFFERENTIAL - Abnormal    WBC Count 15.6 (*)     RBC Count 3.88      Hemoglobin 11.9      Hematocrit 34.3 (*)     MCV 88      MCH 30.7      MCHC 34.7      RDW 12.6      Platelet Count 404      % Neutrophils 74      % Lymphocytes 16      % Monocytes 8      % Eosinophils 1      % Basophils 0      % Immature Granulocytes 1      NRBCs per 100 WBC 0      Absolute Neutrophils 11.5 (*)     Absolute Lymphocytes 2.5      Absolute Monocytes 1.3      Absolute Eosinophils 0.1      Absolute Basophils 0.0      Absolute Immature Granulocytes 0.2      Absolute NRBCs 0.0     ABO/RH TYPE AND SCREEN       Imaging   No orders to display       EKG   None.     Independent Interpretation   None    ED Course      Medications Administered   Medications   sodium chloride 0.9 % infusion ( Intravenous $New Bag 7/2/25 1426)   HYDROmorphone (PF) (DILAUDID) injection 0.5 mg (0.5 mg Intravenous $Given 7/2/25 1421)   HYDROmorphone (PF) (DILAUDID) injection 0.5 mg (0.5 mg Intravenous $Given 7/2/25 1517)       Procedures   Procedures     Discussion of Management   None    ED Course   ED  Course as of 07/02/25 1521   Wed Jul 02, 2025   9335 I obtained history and examined the patient as noted above.        Additional Documentation  None    Medical Decision Making / Diagnosis     CMS Diagnoses: None    MIPS   None               MDM   Latasha Slaughter is a 34 year old adult who had surgery yesterday to remove the testicles comes in with increased swelling and scrotal hematoma.  Exam confirms this.  Ice pack was placed, hemoglobin is 11.9.  I talked with Dr. Vargas the surgeon who will be taking the patient to the operating room.  He is hemodynamically stable.  Given Dilaudid for pain.    Disposition   The patient was sent to the OR.     Diagnosis     ICD-10-CM    1. Hematoma of scrotum  S30.22XA     POst op           Discharge Medications   New Prescriptions    No medications on file         Scribe Disclosure:  I, Breonna Lubin, am serving as a scribe at 2:16 PM on 7/2/2025 to document services personally performed by Rehana Chowdhury MD based on my observations and the provider's statements to me.        Rehana Chowdhury MD  07/02/25 1521

## 2025-07-02 NOTE — TELEPHONE ENCOUNTER
Caller reporting the following red-flag symptom(s): bilateral testicular swelling (size of a baseball), hx of orchiectomy 7/1/25    Per the system red-flag symptom policy, patient was instructed to:  speak with a Registered Nurse    Action:  Patient warm transferred to a Registered Nurse

## 2025-07-02 NOTE — TELEPHONE ENCOUNTER
Destiny reports she woke up this morning to extreme swelling in scrotum, reports size of baseball    Bruising - scrotal skin only  Incisions - can't see well, still covered with gauze  Pain - incision doesn't hurt, just very swollen. Pain is a 6/10 across scotum.  Denies fever/chills, but reports is light-headed  Does have someone who can take her to the ED    Will upload a photo for assessment.

## 2025-07-02 NOTE — ED TRIAGE NOTES
Pt arrives via triage with post op problem, surgery to remove testicles, scrotum still present. Significant swelling and hematoma to scrotum. Pt endorses bleeding from incision.       Triage Assessment (Adult)       Row Name 07/02/25 1141          Triage Assessment    Airway WDL WDL        Respiratory WDL    Respiratory WDL WDL        Cardiac WDL    Cardiac WDL WDL        Peripheral/Neurovascular WDL    Peripheral Neurovascular WDL WDL        Cognitive/Neuro/Behavioral WDL    Cognitive/Neuro/Behavioral WDL WDL

## 2025-07-02 NOTE — ANESTHESIA PREPROCEDURE EVALUATION
Anesthesia Pre-Procedure Evaluation    Patient: Latasha Slaughter   MRN: [id number] : 1990          Procedure : Procedure(s):  Scrotal exploration         Past Medical History:   Diagnosis Date    ADHD (attention deficit hyperactivity disorder)     Allergic rhinitis, cause unspecified     Bipolar 2 disorder (H)     Gastroesophageal reflux disease without esophagitis     Headache(784.0)     Hormone replacement therapy     OCD (obsessive compulsive disorder)     Unspecified adjustment reaction       Past Surgical History:   Procedure Laterality Date    HERNIA REPAIR  as an infant    mole removed  2024    precancerous, clear margins    ORCHIECTOMY SCROTAL Bilateral 2025    Procedure: bilateral simple scrotal orchiectomy;  Surgeon: Christin Vargas MD;  Location: [facility] OR      Allergies   Allergen Reactions    Amoxicillin     Azithromycin Hives    Biaxin [Clarithromycin]     Erythromycin       Social History     Tobacco Use    Smoking status: Never     Passive exposure: Never    Smokeless tobacco: Never   Substance Use Topics    Alcohol use: No      Wt Readings from Last 1 Encounters:   25 102.5 kg (226 lb)        Anesthesia Evaluation   Pt has had prior anesthetic.     No history of anesthetic complications       ROS/MED HX  ENT/Pulmonary:    (-) tobacco use, asthma, COPD and sleep apnea   Neurologic:  - neg neurologic ROS     Cardiovascular:  - neg cardiovascular ROS     METS/Exercise Tolerance:     Hematologic:  - neg hematologic  ROS     Musculoskeletal:       GI/Hepatic:     (+) GERD, Asymptomatic on medication,               (-) liver disease and esophageal disease   Renal/Genitourinary:       Endo:    (-) Type I DM and Type II DM   Psychiatric/Substance Use:     (+) psychiatric history anxiety, depression, other (comment) and bipolar (OCD)       Infectious Disease:       Malignancy:       Other:              Physical Exam  Airway  Mallampati: II  TM distance: >3 FB  Neck ROM: full  Mouth  "opening: >= 4 cm    Cardiovascular   Rhythm: regular  Rate: normal rate     Dental   (+) Modest Abnormalities - crowns, retainers, 1 or 2 missing teeth  Comments: One temporary crown on lower left molar      Pulmonary - normal exam      Neurological - normal exam  She appears awake, alert and oriented x3.    Other Findings       OUTSIDE LABS:  CBC:   Lab Results   Component Value Date    WBC 15.6 (H) 07/02/2025    WBC 12.0 (H) 03/29/2016    HGB 11.9 07/02/2025    HGB 12.7 03/29/2016    HCT 34.3 (L) 07/02/2025    HCT 37.7 03/29/2016     07/02/2025     03/29/2016     BMP:   Lab Results   Component Value Date     07/02/2025     03/29/2016    POTASSIUM 4.1 07/02/2025    POTASSIUM 3.3 (L) 03/29/2016    CHLORIDE 105 07/02/2025    CHLORIDE 102 03/29/2016    CO2 19 (L) 07/02/2025    CO2 23 03/29/2016    BUN 11.8 07/02/2025    BUN 12 03/29/2016    CR 0.68 07/02/2025    CR 0.91 03/29/2016     (H) 07/02/2025     (H) 03/29/2016     COAGS: No results found for: \"PTT\", \"INR\", \"FIBR\"  POC: No results found for: \"BGM\", \"HCG\", \"HCGS\"  HEPATIC:   Lab Results   Component Value Date    ALBUMIN 3.4 03/29/2016    PROTTOTAL 6.3 (L) 03/29/2016    ALT 40 03/29/2016    AST 11 03/29/2016    ALKPHOS 55 03/29/2016    BILITOTAL 0.2 03/29/2016     OTHER:   Lab Results   Component Value Date    BENIGNO 9.1 07/02/2025    LIPASE 74 03/29/2016    TSH 0.84 08/15/2014       Anesthesia Plan    ASA Status:  2, emergent      NPO Status: NPO Appropriate   Anesthesia Type: General.  Airway: supraglottic airway.  Induction: intravenous.  Maintenance: Balanced.   Techniques and Equipment:     - Airway:  Planned airway equipment includes supraglottic airway.     - Monitoring Plan: standard ASA monitoring     Consents    Anesthesia Plan(s) and associated risks, benefits, and realistic alternatives discussed. Questions answered and patient/representative(s) expressed understanding.     - Discussed: anesthesiologist     - " "Discussed with:  Patient        - Pt is DNR/DNI Status: no DNR          Postoperative Care    Pain management: multimodal analgesia.     Comments:                   Bryon Kamara MD    I have reviewed the pertinent notes and labs in the chart from the past 30 days and (re)examined the patient.  Any updates or changes from those notes are reflected in this note.    Clinically Significant Risk Factors Present on Admission                             # Obesity: Estimated body mass index is 33.37 kg/m  as calculated from the following:    Height as of this encounter: 1.753 m (5' 9\").    Weight as of this encounter: 102.5 kg (226 lb).                    "

## 2025-07-02 NOTE — TELEPHONE ENCOUNTER
RN viewed photo and sent patient to ED. Communicated with surgeon who is currently at [facility] and RN called patient back to reroute her to [facility].  Norma Suresh RN on 7/2/2025 at 10:39 AM

## 2025-07-02 NOTE — DISCHARGE INSTRUCTIONS
Same Day Surgery Discharge Instructions for  Sedation and General Anesthesia     It's not unusual to feel dizzy, light-headed or faint for up to 24 hours after surgery or while taking pain medication.  If you have these symptoms: sit for a few minutes before standing and have someone assist you when you get up to walk or use the bathroom.    You should rest and relax for the next 24 hours. We recommend you make arrangements to have an adult stay with you for at least 24 hours after your discharge.  Avoid hazardous and strenuous activity.    DO NOT DRIVE any vehicle or operate mechanical equipment for 24 hours following the end of your surgery.  Even though you may feel normal, your reactions may be affected by the medication you have received.    Do not drink alcoholic beverages for 24 hours following surgery.     Slowly progress to your regular diet as you feel able. It's not unusual to feel nauseated and/or vomit after receiving anesthesia.  If you develop these symptoms, drink clear liquids (apple juice, ginger ale, broth, 7-up, etc. ) until you feel better.  If your nausea and vomiting persists for 24 hours, please notify your surgeon.      All narcotic pain medications, along with inactivity and anesthesia, can cause constipation. Drinking plenty of liquids and increasing fiber intake will help.    For any questions of a medical nature, call your surgeon.    Do not make important decisions for 24 hours.    If you had general anesthesia, you may have a sore throat for a couple of days related to the breathing tube used during surgery.  You may use Cepacol lozenges to help with this discomfort.  If it worsens or if you develop a fever, contact your surgeon.     If you feel your pain is not well managed with the pain medications prescribed by your surgeon, please contact your surgeon's office to let them know so they can address your concerns.          Today you were given 975 mg of Tylenol at 6pm.  The  recommended daily maximum dose is 4000 mg.        Jordy Severino Drain  Home Care Instructions    What is a Jordy Severino (RON) Drain?  This is a small tube that connects to a bulb.  Its gentle suction removes extra fluid from a surgical wound.  Your doctor will remove the tube when the amount of fluid decreases.  The color and amount of fluid varies.  Right after surgery the fluid is bright red.  Over time, it changes to light pink and may become clear or the color of straw.    How should I care for my tube site?  Keep the skin around the tube dry.  Check with your doctor about how to shower.  You may need to cover the site with plastic when you shower.  Or, it may be okay to let the site get wet and put on a clean bandage after you shower.    If the bandage gets wet, you will need to change it.  How should I care for the bulb?  Keep the bulb compressed at all times except while you empty it.   Attach the bulb to your clothing with tape and a safety pin.  Try to empty the bulb at the same time every day.  Empty the bulb at least once a day, or when the bulb becomes half full.  If it becomes too full, there will not be enough suction.    To empty the bulb:  Wash your hands.  Open the bulb cap.  Drain the fluid from the bulb into the measuring cup.  If you have two drains, use two cups.    Clean the mouth of the bulb with an alcohol wipe if your nurse told you to.  Squeeze the bulb (fold it in half before you close the bulb cap) If it does not stay compressed, call your nurse or clinic.  Write the amount of drainage on the drainage record (see back page).  If you have two drains, write the amount for each bulb.  Flush the drainage down the toilet.  Rinse the measuring cup.  Wash your hands.    When should I call my doctor?   Call your doctor if:  You have a fever over 101 F (38.3 C), taken under the tongue.   The drainage increases or smells bad.  The skin around your tube has increased redness, swelling, warmth or  pain.  You have pus or fluid leaking at the tube site.  Your stitches break.  You think the tube is not draining.  The tube falls out.  You have any problems or concerns.    Your drainage record:    Empty your bulb at least once per day or when 1/2 to 1/3 full.  Write down the date, time and amount of drainage in each bulb.   Bring this record to each clinic visit.    Date Time Bulb 1: amount of  Drainage in (ml or cc) Bulb 2: amount of drainage (in ml or cc) Notes                                                                 **If you have questions or concerns about your procedure,   call Dr. Vargas  [phone]**

## 2025-07-03 ENCOUNTER — HOSPITAL ENCOUNTER (OUTPATIENT)
Facility: CLINIC | Age: 35
Setting detail: OBSERVATION
Discharge: HOME | End: 2025-07-03
Attending: EMERGENCY MEDICINE | Admitting: STUDENT IN AN ORGANIZED HEALTH CARE EDUCATION/TRAINING PROGRAM
Payer: COMMERCIAL

## 2025-07-03 VITALS
WEIGHT: 220 LBS | SYSTOLIC BLOOD PRESSURE: 127 MMHG | TEMPERATURE: 97.8 F | DIASTOLIC BLOOD PRESSURE: 79 MMHG | BODY MASS INDEX: 32.49 KG/M2 | HEART RATE: 66 BPM | RESPIRATION RATE: 16 BRPM | OXYGEN SATURATION: 99 %

## 2025-07-03 DIAGNOSIS — D64.9 ANEMIA, UNSPECIFIED TYPE: ICD-10-CM

## 2025-07-03 DIAGNOSIS — Z48.89 ENCOUNTER FOR POSTOPERATIVE WOUND CHECK: ICD-10-CM

## 2025-07-03 DIAGNOSIS — R58 BLEEDING: ICD-10-CM

## 2025-07-03 LAB
ANION GAP SERPL CALCULATED.3IONS-SCNC: 10 MMOL/L (ref 7–15)
BASOPHILS # BLD AUTO: 0 10E3/UL (ref 0–0.2)
BASOPHILS NFR BLD AUTO: 0 %
BUN SERPL-MCNC: 8.3 MG/DL (ref 6–20)
CALCIUM SERPL-MCNC: 8.1 MG/DL (ref 8.8–10.4)
CHLORIDE SERPL-SCNC: 102 MMOL/L (ref 98–107)
CREAT SERPL-MCNC: 0.65 MG/DL (ref 0.51–1.17)
EGFRCR SERPLBLD CKD-EPI 2021: >90 ML/MIN/1.73M2
EOSINOPHIL # BLD AUTO: 0 10E3/UL (ref 0–0.7)
EOSINOPHIL NFR BLD AUTO: 0 %
ERYTHROCYTE [DISTWIDTH] IN BLOOD BY AUTOMATED COUNT: 12.9 % (ref 10–15)
GLUCOSE SERPL-MCNC: 160 MG/DL (ref 70–99)
HCO3 SERPL-SCNC: 22 MMOL/L (ref 22–29)
HCT VFR BLD AUTO: 28.3 % (ref 35–53)
HGB BLD-MCNC: 10.2 G/DL (ref 11.7–17.7)
HGB BLD-MCNC: 9.8 G/DL (ref 11.7–17.7)
HOLD SPECIMEN: NORMAL
IMM GRANULOCYTES # BLD: 0.1 10E3/UL
IMM GRANULOCYTES NFR BLD: 1 %
LYMPHOCYTES # BLD AUTO: 1.2 10E3/UL (ref 0.8–5.3)
LYMPHOCYTES NFR BLD AUTO: 9 %
MCH RBC QN AUTO: 31.2 PG (ref 26.5–33)
MCHC RBC AUTO-ENTMCNC: 34.6 G/DL (ref 31.5–36.5)
MCV RBC AUTO: 90 FL (ref 78–100)
MCV RBC AUTO: 90 FL (ref 78–100)
MONOCYTES # BLD AUTO: 0.9 10E3/UL (ref 0–1.3)
MONOCYTES NFR BLD AUTO: 7 %
NEUTROPHILS # BLD AUTO: 11.3 10E3/UL (ref 1.6–8.3)
NEUTROPHILS NFR BLD AUTO: 83 %
NRBC # BLD AUTO: 0 10E3/UL
NRBC BLD AUTO-RTO: 0 /100
PATH REPORT.COMMENTS IMP SPEC: NORMAL
PATH REPORT.COMMENTS IMP SPEC: NORMAL
PATH REPORT.FINAL DX SPEC: NORMAL
PATH REPORT.GROSS SPEC: NORMAL
PATH REPORT.RELEVANT HX SPEC: NORMAL
PHOTO IMAGE: NORMAL
PLATELET # BLD AUTO: 339 10E3/UL (ref 150–450)
POTASSIUM SERPL-SCNC: 3.7 MMOL/L (ref 3.4–5.3)
RBC # BLD AUTO: 3.14 10E6/UL (ref 3.8–5.9)
SODIUM SERPL-SCNC: 134 MMOL/L (ref 135–145)
WBC # BLD AUTO: 13.5 10E3/UL (ref 4–11)

## 2025-07-03 PROCEDURE — 85018 HEMOGLOBIN: CPT | Performed by: STUDENT IN AN ORGANIZED HEALTH CARE EDUCATION/TRAINING PROGRAM

## 2025-07-03 PROCEDURE — 99285 EMERGENCY DEPT VISIT HI MDM: CPT | Mod: 25

## 2025-07-03 PROCEDURE — 96374 THER/PROPH/DIAG INJ IV PUSH: CPT

## 2025-07-03 PROCEDURE — 250N000011 HC RX IP 250 OP 636: Performed by: STUDENT IN AN ORGANIZED HEALTH CARE EDUCATION/TRAINING PROGRAM

## 2025-07-03 PROCEDURE — 250N000013 HC RX MED GY IP 250 OP 250 PS 637: Performed by: EMERGENCY MEDICINE

## 2025-07-03 PROCEDURE — 250N000013 HC RX MED GY IP 250 OP 250 PS 637: Performed by: STUDENT IN AN ORGANIZED HEALTH CARE EDUCATION/TRAINING PROGRAM

## 2025-07-03 PROCEDURE — 85004 AUTOMATED DIFF WBC COUNT: CPT | Performed by: EMERGENCY MEDICINE

## 2025-07-03 PROCEDURE — 80048 BASIC METABOLIC PNL TOTAL CA: CPT | Performed by: EMERGENCY MEDICINE

## 2025-07-03 PROCEDURE — 36415 COLL VENOUS BLD VENIPUNCTURE: CPT | Performed by: EMERGENCY MEDICINE

## 2025-07-03 PROCEDURE — 36415 COLL VENOUS BLD VENIPUNCTURE: CPT | Performed by: STUDENT IN AN ORGANIZED HEALTH CARE EDUCATION/TRAINING PROGRAM

## 2025-07-03 PROCEDURE — G0378 HOSPITAL OBSERVATION PER HR: HCPCS

## 2025-07-03 PROCEDURE — 96375 TX/PRO/DX INJ NEW DRUG ADDON: CPT

## 2025-07-03 PROCEDURE — 250N000009 HC RX 250: Performed by: STUDENT IN AN ORGANIZED HEALTH CARE EDUCATION/TRAINING PROGRAM

## 2025-07-03 PROCEDURE — 85041 AUTOMATED RBC COUNT: CPT | Performed by: EMERGENCY MEDICINE

## 2025-07-03 RX ORDER — PROPRANOLOL HYDROCHLORIDE 10 MG/1
10 TABLET ORAL DAILY PRN
Status: DISCONTINUED | OUTPATIENT
Start: 2025-07-03 | End: 2025-07-03 | Stop reason: HOSPADM

## 2025-07-03 RX ORDER — THERA TABS 400 MCG
1 TAB ORAL DAILY
COMMUNITY

## 2025-07-03 RX ORDER — PROGESTERONE 100 MG/1
100 CAPSULE ORAL AT BEDTIME
Status: DISCONTINUED | OUTPATIENT
Start: 2025-07-03 | End: 2025-07-03 | Stop reason: HOSPADM

## 2025-07-03 RX ORDER — OXYCODONE HYDROCHLORIDE 5 MG/1
5 TABLET ORAL ONCE
Refills: 0 | Status: COMPLETED | OUTPATIENT
Start: 2025-07-03 | End: 2025-07-03

## 2025-07-03 RX ORDER — AMOXICILLIN 250 MG
2 CAPSULE ORAL 2 TIMES DAILY PRN
Status: DISCONTINUED | OUTPATIENT
Start: 2025-07-03 | End: 2025-07-03 | Stop reason: HOSPADM

## 2025-07-03 RX ORDER — HYDROMORPHONE HYDROCHLORIDE 1 MG/ML
0.5 INJECTION, SOLUTION INTRAMUSCULAR; INTRAVENOUS; SUBCUTANEOUS
Status: DISCONTINUED | OUTPATIENT
Start: 2025-07-03 | End: 2025-07-03 | Stop reason: HOSPADM

## 2025-07-03 RX ORDER — ONDANSETRON 2 MG/ML
4 INJECTION INTRAMUSCULAR; INTRAVENOUS EVERY 6 HOURS PRN
Status: DISCONTINUED | OUTPATIENT
Start: 2025-07-03 | End: 2025-07-03 | Stop reason: HOSPADM

## 2025-07-03 RX ORDER — LURASIDONE HYDROCHLORIDE 20 MG/1
40 TABLET, FILM COATED ORAL EVERY EVENING
Status: DISCONTINUED | OUTPATIENT
Start: 2025-07-03 | End: 2025-07-03 | Stop reason: HOSPADM

## 2025-07-03 RX ORDER — LAMOTRIGINE 100 MG/1
300 TABLET, EXTENDED RELEASE ORAL AT BEDTIME
Status: DISCONTINUED | OUTPATIENT
Start: 2025-07-03 | End: 2025-07-03 | Stop reason: HOSPADM

## 2025-07-03 RX ORDER — OXYCODONE HYDROCHLORIDE 5 MG/1
5 TABLET ORAL EVERY 6 HOURS PRN
Status: DISCONTINUED | OUTPATIENT
Start: 2025-07-03 | End: 2025-07-03 | Stop reason: HOSPADM

## 2025-07-03 RX ORDER — ESTRADIOL VALERATE 10 MG/ML
5 INJECTION INTRAMUSCULAR ONCE
Status: DISCONTINUED | OUTPATIENT
Start: 2025-07-03 | End: 2025-07-03

## 2025-07-03 RX ORDER — CETIRIZINE HYDROCHLORIDE 10 MG/1
10 TABLET ORAL DAILY
Status: DISCONTINUED | OUTPATIENT
Start: 2025-07-03 | End: 2025-07-03 | Stop reason: HOSPADM

## 2025-07-03 RX ORDER — TRANEXAMIC ACID 10 MG/ML
1 INJECTION, SOLUTION INTRAVENOUS ONCE
Status: COMPLETED | OUTPATIENT
Start: 2025-07-03 | End: 2025-07-03

## 2025-07-03 RX ORDER — PROCHLORPERAZINE MALEATE 10 MG
10 TABLET ORAL EVERY 6 HOURS PRN
Status: DISCONTINUED | OUTPATIENT
Start: 2025-07-03 | End: 2025-07-03 | Stop reason: HOSPADM

## 2025-07-03 RX ORDER — SUMATRIPTAN 50 MG/1
50 TABLET, FILM COATED ORAL 2 TIMES DAILY PRN
Status: DISCONTINUED | OUTPATIENT
Start: 2025-07-03 | End: 2025-07-03 | Stop reason: HOSPADM

## 2025-07-03 RX ORDER — AMOXICILLIN 250 MG
1 CAPSULE ORAL 2 TIMES DAILY PRN
Status: DISCONTINUED | OUTPATIENT
Start: 2025-07-03 | End: 2025-07-03 | Stop reason: HOSPADM

## 2025-07-03 RX ORDER — BUSPIRONE HYDROCHLORIDE 10 MG/1
10 TABLET ORAL 2 TIMES DAILY
Status: DISCONTINUED | OUTPATIENT
Start: 2025-07-03 | End: 2025-07-03 | Stop reason: HOSPADM

## 2025-07-03 RX ORDER — THERA TABS 400 MCG
1 TAB ORAL DAILY
Status: DISCONTINUED | OUTPATIENT
Start: 2025-07-03 | End: 2025-07-03 | Stop reason: HOSPADM

## 2025-07-03 RX ORDER — PANTOPRAZOLE SODIUM 40 MG/1
40 TABLET, DELAYED RELEASE ORAL
Status: DISCONTINUED | OUTPATIENT
Start: 2025-07-03 | End: 2025-07-03 | Stop reason: HOSPADM

## 2025-07-03 RX ORDER — ONDANSETRON 4 MG/1
4 TABLET, ORALLY DISINTEGRATING ORAL EVERY 6 HOURS PRN
Status: DISCONTINUED | OUTPATIENT
Start: 2025-07-03 | End: 2025-07-03 | Stop reason: HOSPADM

## 2025-07-03 RX ADMIN — TRANEXAMIC ACID 1 G: 10 INJECTION, SOLUTION INTRAVENOUS at 08:58

## 2025-07-03 RX ADMIN — OXYCODONE HYDROCHLORIDE 5 MG: 5 TABLET ORAL at 06:34

## 2025-07-03 RX ADMIN — HYDROMORPHONE HYDROCHLORIDE 0.5 MG: 1 INJECTION, SOLUTION INTRAMUSCULAR; INTRAVENOUS; SUBCUTANEOUS at 09:00

## 2025-07-03 RX ADMIN — OXYCODONE HYDROCHLORIDE 5 MG: 5 TABLET ORAL at 12:28

## 2025-07-03 RX ADMIN — SENNOSIDES AND DOCUSATE SODIUM 1 TABLET: 50; 8.6 TABLET ORAL at 10:47

## 2025-07-03 RX ADMIN — PANTOPRAZOLE SODIUM 40 MG: 40 TABLET, DELAYED RELEASE ORAL at 11:29

## 2025-07-03 ASSESSMENT — ACTIVITIES OF DAILY LIVING (ADL)
ADLS_ACUITY_SCORE: 42

## 2025-07-03 ASSESSMENT — COLUMBIA-SUICIDE SEVERITY RATING SCALE - C-SSRS
6. HAVE YOU EVER DONE ANYTHING, STARTED TO DO ANYTHING, OR PREPARED TO DO ANYTHING TO END YOUR LIFE?: NO
2. HAVE YOU ACTUALLY HAD ANY THOUGHTS OF KILLING YOURSELF IN THE PAST MONTH?: NO
1. IN THE PAST MONTH, HAVE YOU WISHED YOU WERE DEAD OR WISHED YOU COULD GO TO SLEEP AND NOT WAKE UP?: NO

## 2025-07-03 NOTE — ED NOTES
07/03/25 1100   Drain Closed/Suction 1 Groin   Placement Date: 07/02/25   Drain Type (Required): Closed/Suction  Tube Number: 1  Location: Groin   Output (mL) 15 ml

## 2025-07-03 NOTE — ED PROVIDER NOTES
Emergency Department Note      History of Present Illness     Chief Complaint   Post-op Problem      HPI   Latasha Slaughter is a 34 year old adult who presents due to bleeding from the scrotum.  Patient had an orchectomy done on 7/1.  She then had a hematoma, back to the operating room on the evening of 7/2 for a washout and a drain was placed.  She reports that this evening she had misunderstood how to place the ORN drain to suction and the drain was not functioning and she had increasing pain and swelling and bleeding around the drain, prompting her to return to the emergency department.    Independent Historian   None    Review of External Notes   Dr. Vargas surgical note 7/2/25    Past Medical History     Medical History and Problem List   Past Medical History:   Diagnosis Date    ADHD (attention deficit hyperactivity disorder)     Allergic rhinitis, cause unspecified     Bipolar 2 disorder (H)     Gastroesophageal reflux disease without esophagitis     Headache(784.0)     Hormone replacement therapy     OCD (obsessive compulsive disorder)     Unspecified adjustment reaction        Medications   acetaminophen (TYLENOL) 325 MG tablet  busPIRone (BUSPAR) 10 MG tablet  cetirizine (ZYRTEC) 10 MG tablet  estradiol valerate (DELESTROGEN) 20 MG/ML injection  LamoTRIgine (LAMICTAL XR) 300 MG 24 hr tablet  lurasidone (LATUDA) 40 MG TABS tablet  omeprazole (PRILOSEC) 20 MG DR capsule  oxyCODONE (ROXICODONE) 5 MG tablet  oxyCODONE (ROXICODONE) 5 MG tablet  progesterone (PROMETRIUM) 100 MG capsule  propranolol (INDERAL) 10 MG tablet  senna-docusate (SENOKOT-S/PERICOLACE) 8.6-50 MG tablet  spironolactone (ALDACTONE) 25 MG tablet  SUMAtriptan (IMITREX) 50 MG tablet  topiramate (TOPAMAX) 25 MG tablet        Surgical History   Past Surgical History:   Procedure Laterality Date    HERNIA REPAIR  as an infant    mole removed  12/2024    precancerous, clear margins    ORCHIECTOMY SCROTAL Bilateral 7/1/2025    Procedure: bilateral  simple scrotal orchiectomy;  Surgeon: Christin Vargas MD;  Location: [facility] OR       Physical Exam     Patient Vitals for the past 24 hrs:   BP Temp Temp src Pulse Resp SpO2 Weight   07/03/25 0646 114/68 -- -- 59 -- 98 % --   07/03/25 0331 111/61 -- -- 61 -- 97 % --   07/03/25 0300 112/58 -- -- 65 -- 96 % --   07/03/25 0230 115/65 -- -- 68 -- 98 % --   07/03/25 0221 126/75 98.1  F (36.7  C) Oral 77 16 99 % 99.8 kg (220 lb)     Physical Exam  General: Does not appear in acute distress  Head: No signs of trauma.   CV: Normal rate and regular rhythm.    Resp: Effort normal and breath sounds normal. No respiratory distress.   GI: Soft. There is no tenderness.  No rebound or guarding.  Normal bowel sounds.    :  ecchymosis and edema to penis and scrotum.  RON drain from right scrotum in place with blood draining into drain.  No bleeding around the drain.    MSK: Normal range of motion.   Neuro: The patient is alert and oriented. Speech normal.  Skin: Skin is warm and dry. No rash noted.   Psych: normal mood and affect. behavior is normal.       Diagnostics     Lab Results   Labs Ordered and Resulted from Time of ED Arrival to Time of ED Departure   BASIC METABOLIC PANEL - Abnormal       Result Value    Sodium 134 (*)     Potassium 3.7      Chloride 102      Carbon Dioxide (CO2) 22      Anion Gap 10      Urea Nitrogen 8.3      Creatinine 0.65      GFR Estimate >90      Calcium 8.1 (*)     Glucose 160 (*)    CBC WITH PLATELETS AND DIFFERENTIAL - Abnormal    WBC Count 13.5 (*)     RBC Count 3.14 (*)     Hemoglobin 9.8 (*)     Hematocrit 28.3 (*)     MCV 90      MCH 31.2      MCHC 34.6      RDW 12.9      Platelet Count 339      % Neutrophils 83      % Lymphocytes 9      % Monocytes 7      % Eosinophils 0      % Basophils 0      % Immature Granulocytes 1      NRBCs per 100 WBC 0      Absolute Neutrophils 11.3 (*)     Absolute Lymphocytes 1.2      Absolute Monocytes 0.9      Absolute Eosinophils 0.0      Absolute Basophils  0.0      Absolute Immature Granulocytes 0.1      Absolute NRBCs 0.0         Imaging   No orders to display           ED Course      Medications Administered   Medications   oxyCODONE (ROXICODONE) tablet 5 mg (5 mg Oral $Given 7/3/25 5668)       Procedures   Procedures     Discussion of Management   Dr. Slaughter, urology, regarding pt's presentation.  Spoke with Dr. Slaughter, urology, for admission.    ED Course        Additional Documentation  None    Medical Decision Making / Diagnosis     CMS Diagnoses: None    MIPS   None               MDM   Latasha Slaugther is a 34 year old adult who presents due to her scrotal RON drain not working properly.  She had an orchectomy done and a subsequent hematoma and washout last night.  She had gone home and emptied the RON drain, but had misunderstood how to place it to suction with collapsing the bulb.  The drain was not draining blood properly and the patient had increased discomfort and bleeding around the drain, prompting her to come to the ER.  The nurse was able to correct the suction of the RON drain prior to my evaluation.  At the time my evaluation the patient reports that she was overall feeling better with decreased discomfort.  There was bloody drainage into the RON drain with no continued significant bleeding or drainage around the drain.  I did obtain blood which did show that hemoglobin had dropped a couple of points from yesterday.  Patient did have approximately 85 mL of bloody output over just over 2-hour period.  I did speak with urology, and ultimately in speaking with urology and the patient, it was decided to admit the patient for observation and recheck in the morning to ensure stability.    Disposition   The patient was discharged.     Diagnosis     ICD-10-CM    1. Encounter for postoperative wound check  Z48.89       2. Bleeding  R58       3. Anemia, unspecified type  D64.9                       Chris Benavidez MD  07/03/25 0722

## 2025-07-03 NOTE — OR NURSING
Discharge instruction given to yolie mom Keli. RON educations was done by this writer. No questions and concerns at this time.

## 2025-07-03 NOTE — ED TRIAGE NOTES
BIBA for post op bleeding. Had a bilateral orchiectomy done on 7/2 at 1800, sent home at 2100 with RON drain in place. After pt emptied the drain at home, bleeding from site around drain started and would not stop for 45 minutes before calling EMS. Refused EMS interventions.     Triage Assessment (Adult)       Row Name 07/03/25 0230          Triage Assessment    Airway WDL WDL        Respiratory WDL    Respiratory WDL WDL        Skin Circulation/Temperature WDL    Skin Circulation/Temperature WDL WDL        Cardiac WDL    Cardiac WDL WDL        Peripheral/Neurovascular WDL    Peripheral Neurovascular WDL WDL        Cognitive/Neuro/Behavioral WDL    Cognitive/Neuro/Behavioral WDL WDL

## 2025-07-03 NOTE — H&P
Urology History and Physical    Name: Latasha Slaughter    MRN: [id number]   YOB: 1990               Chief Complaint:   Drain malfunction    History is obtained from the patient          History of Present Illness:   Latasha Slaughter is a 34 year old trans female patient who is POD2 bilateral orch and POD1 scrotal washout for hematoma, who presents due to her scrotal RON drain not working properly. She had gone home and emptied the RON drain, but had misunderstood how to place it to suction with collapsing the bulb.  The drain was not draining blood properly and the patient had increased discomfort and bleeding around the drain, prompting her to come to the ER.  The nurse was able to correct the suction of the RON drain. 85mL of output in 2 hours, 100mL output in 5 hours. Hgb 9.8 from 11.9 yesterday morning. Overall pain controlled with oxycodone. No fevers/chills.            Past Medical History:     Past Medical History:   Diagnosis Date    ADHD (attention deficit hyperactivity disorder)     Allergic rhinitis, cause unspecified     Bipolar 2 disorder (H)     Gastroesophageal reflux disease without esophagitis     Headache(784.0)     Hormone replacement therapy     OCD (obsessive compulsive disorder)     Unspecified adjustment reaction             Past Surgical History:     Past Surgical History:   Procedure Laterality Date    HERNIA REPAIR  as an infant    mole removed  12/2024    precancerous, clear margins    ORCHIECTOMY SCROTAL Bilateral 7/1/2025    Procedure: bilateral simple scrotal orchiectomy;  Surgeon: Christin Vargas MD;  [place] [facility] [place]            Social History:     Social History     Tobacco Use    Smoking status: Never     Passive exposure: Never    Smokeless tobacco: Never   Substance Use Topics    Alcohol use: No            Family History:     Family History   Problem Relation Age of Onset    Alcohol/Drug Father         recovered    Depression Mother         anxiety    Depression Father          anxiety    Depression Sister         social anxiety    Depression Maternal Grandmother     Depression Paternal Grandfather     Alcohol/Drug Paternal Grandfather     C.A.D. Paternal Grandmother     Diabetes Paternal Grandmother     Obesity Paternal Grandmother     Obesity Paternal Grandfather     Cancer No family hx of               Allergies:     Allergies   Allergen Reactions    Amoxicillin     Azithromycin Hives    Biaxin [Clarithromycin]     Erythromycin             Medications:     No current facility-administered medications for this encounter.     Current Outpatient Medications   Medication Sig Dispense Refill    acetaminophen (TYLENOL) 325 MG tablet Take 2 tablets (650 mg) by mouth every 4 hours as needed for mild pain. 50 tablet 0    busPIRone (BUSPAR) 10 MG tablet Take 1 tablet (10 mg) by mouth 2 times daily. 180 tablet 1    cetirizine (ZYRTEC) 10 MG tablet Take 10 mg by mouth daily.      estradiol valerate (DELESTROGEN) 20 MG/ML injection Inject 5 mg into the muscle once a week. Discard vial after 28 days after puncture      LamoTRIgine (LAMICTAL XR) 300 MG 24 hr tablet Take 1 tablet (300 mg) by mouth daily. 90 tablet 1    lurasidone (LATUDA) 40 MG TABS tablet Take 1 tablet (40 mg) by mouth daily with food. 90 tablet 3    omeprazole (PRILOSEC) 20 MG DR capsule Take 1 capsule (20 mg) by mouth daily. 90 capsule 3    oxyCODONE (ROXICODONE) 5 MG tablet Take 1 tablet (5 mg) by mouth every 6 hours as needed for pain. 12 tablet 0    oxyCODONE (ROXICODONE) 5 MG tablet Take 1 tablet (5 mg) by mouth every 6 hours as needed for severe pain. 6 tablet 0    progesterone (PROMETRIUM) 100 MG capsule Take 1 capsule (100 mg) by mouth daily. 30 capsule 0    propranolol (INDERAL) 10 MG tablet Take 1 tablet by mouth daily as needed.      senna-docusate (SENOKOT-S/PERICOLACE) 8.6-50 MG tablet Take 1-2 tablets by mouth 2 times daily. 30 tablet 0    spironolactone (ALDACTONE) 25 MG tablet Take 2 tablets (50 mg) by mouth  daily. 270 tablet 1    SUMAtriptan (IMITREX) 50 MG tablet Take 1 tablet (50 mg) by mouth at onset of headache for migraine. May repeat in 2 hours. Max 4 tablets/24 hours. 12 tablet 0    topiramate (TOPAMAX) 25 MG tablet Take 1 tablet (25 mg) by mouth daily. Increase to 50mg after 1 week if no side effects / side effects ok 30 tablet 3             Review of Systems:    ROS: 10 point ROS neg other than the symptoms noted above in the HPI.          Physical Exam:   Patient Vitals for the past 24 hrs:   BP Temp Temp src Pulse Resp SpO2 Weight   07/03/25 0646 114/68 -- -- 59 -- 98 % --   07/03/25 0331 111/61 -- -- 61 -- 97 % --   07/03/25 0300 112/58 -- -- 65 -- 96 % --   07/03/25 0230 115/65 -- -- 68 -- 98 % --   07/03/25 0221 126/75 98.1  F (36.7  C) Oral 77 16 99 % 99.8 kg (220 lb)     General: age-appropriate appearing adult in NAD  HEENT: Head AT/NC, EOMI, CN Grossly intact  Lungs: no respiratory distress, or pursed lip breathing  Heart: No obvious jugular venous distension present  Abdomen: soft, non-distended, non-tender. No organomegaly  : Uncircumcised penis with foreskin swelling around glans. Scrotum soft, decompressed, tender to touch, with persistent significant bruising. RON drain in place, with sanguinous drainage.   Neuro: Alert, oriented, speech and mentation normal; moving all 4 extremities equally.  Psych: Affect and mood normal           Data:   All laboratory data reviewed:    Recent Labs   Lab 07/03/25  0314 07/02/25  1147   WBC 13.5* 15.6*   HGB 9.8* 11.9    404       Recent Labs   Lab 07/03/25  0314 07/02/25  1147   * 136   POTASSIUM 3.7 4.1   CHLORIDE 102 105   CO2 22 19*   BUN 8.3 11.8   CR 0.65 0.68   * 151*   BENIGNO 8.1* 9.1          Impression and Plan:   Impression:   Latasha Slaughter is a 34 year old trans female patient who is POD2 bilateral orch and POD1 scrotal washout for hematoma, who presents due to her scrotal RON drain not working properly. Expect that immediate  drainage of fluid was due to drain not being on suction for a few hours, and that this will trend downwards appropriately. Offered observation versus discharge with strict return precautions. Patient opted to stay. Will admit to obs and recheck Hgb around noon. If counts are stable and she is feeling well, can discharge later today versus tomorrow.       Plan:   - admit to obs  - recheck Hgb at noon  - continue to trend RON outputs       Patient discussed with Dr. Vargas.     Ana Acevedo MD  Urology Resident, PGY-5

## 2025-07-03 NOTE — ED NOTES
[facility]    ED Nurse Handoff Report    ED Chief complaint: Post-op Problem      ED Diagnosis:   Final diagnoses:   Encounter for postoperative wound check   Bleeding       Code Status: Full Code    Allergies:   Allergies   Allergen Reactions    Amoxicillin     Azithromycin Hives    Biaxin [Clarithromycin]     Erythromycin        Patient Story:  BIBA for post op bleeding. Had a bilateral orchiectomy done on 7/2 at 1800, sent home at 2100 with RON drain in place. After pt emptied the drain at home, bleeding from site around drain started and would not stop for 45 minutes before calling EMS. Refused EMS interventions.      Focused Assessment:        Labs Ordered and Resulted from Time of ED Arrival to Time of ED Departure   BASIC METABOLIC PANEL - Abnormal       Result Value    Sodium 134 (*)     Potassium 3.7      Chloride 102      Carbon Dioxide (CO2) 22      Anion Gap 10      Urea Nitrogen 8.3      Creatinine 0.65      GFR Estimate >90      Calcium 8.1 (*)     Glucose 160 (*)    CBC WITH PLATELETS AND DIFFERENTIAL - Abnormal    WBC Count 13.5 (*)     RBC Count 3.14 (*)     Hemoglobin 9.8 (*)     Hematocrit 28.3 (*)     MCV 90      MCH 31.2      MCHC 34.6      RDW 12.9      Platelet Count 339      % Neutrophils 83      % Lymphocytes 9      % Monocytes 7      % Eosinophils 0      % Basophils 0      % Immature Granulocytes 1      NRBCs per 100 WBC 0      Absolute Neutrophils 11.3 (*)     Absolute Lymphocytes 1.2      Absolute Monocytes 0.9      Absolute Eosinophils 0.0      Absolute Basophils 0.0      Absolute Immature Granulocytes 0.1      Absolute NRBCs 0.0         No orders to display         Treatments and/or interventions provided:      Medications - No data to display    Patient's response to treatments and/or interventions:        To be done/followed up on inpatient unit:   See any in-patient orders    Does this patient have any cognitive concerns?:      Activity level - Baseline/Home:     Independent    Activity Level - Current:    Independent    Patient's Preferred language: English     Needed?: No    Isolation: None  Infection: Not Applicable  Patient tested for COVID 19 prior to admission: NO    Bariatric?: No    Vital Signs:   Vitals:    07/03/25 0221 07/03/25 0230 07/03/25 0300 07/03/25 0331   BP: 126/75 115/65 112/58 111/61   Pulse: 77 68 65 61   Resp: 16      Temp: 98.1  F (36.7  C)      TempSrc: Oral      SpO2: 99% 98% 96% 97%   Weight: 99.8 kg (220 lb)          Cardiac Rhythm:     Was the PSS-3 completed:   Yes  What interventions are required if any?               Family Comments:   OBS brochure/video discussed/provided to patient/family: N/A              Name of person given brochure if not patient:               Relationship to patient:     For the majority of the shift this patient's behavior was Green.  Behavioral interventions performed were .    ED NURSE PHONE NUMBER: [id number]

## 2025-07-03 NOTE — ED NOTES
07/03/25 0900   Drain Closed/Suction 1 Groin   Placement Date: 07/02/25   Drain Type (Required): Closed/Suction  Tube Number: 1  Location: Groin   Output (mL) 30 ml

## 2025-07-03 NOTE — ED NOTES
RN emptied patients RON drain and there was 35mL output. Patient also took her at home psych meds. Patient did not ask about meds prior to her taking them.

## 2025-07-03 NOTE — ANESTHESIA CARE TRANSFER NOTE
Patient: Latasha Slaughter    Procedure: Procedure(s):  Scrotal exploration, with evacuation hematoma       Diagnosis: Hematoma [T14.8XXA]  Diagnosis Additional Information: No value filed.    Anesthesia Type:   General     Note:    Oropharynx: oropharynx clear of all foreign objects  Level of Consciousness: drowsy  Oxygen Supplementation: room air    Independent Airway: airway patency satisfactory and stable  Dentition: dentition unchanged  Vital Signs Stable: post-procedure vital signs reviewed and stable  Report to RN Given: handoff report given  Patient transferred to: PACU    Handoff Report: Identifed the Patient, Identified the Reponsible Provider, Reviewed the pertinent medical history, Discussed the surgical course, Reviewed Intra-OP anesthesia mangement and issues during anesthesia, Set expectations for post-procedure period and Allowed opportunity for questions and acknowledgement of understanding  Vitals:  Vitals Value Taken Time   /70 07/02/25 20:45   Temp 36.5  C (97.7  F) 07/02/25 20:45   Pulse 63 07/02/25 20:54   Resp 10 07/02/25 20:54   SpO2 98 % 07/02/25 20:54   Vitals shown include unfiled device data.    Electronically Signed By: Bryon Kamara MD  July 2, 2025  8:55 PM

## 2025-07-03 NOTE — ED NOTES
07/03/25 1300   Drain Closed/Suction 1 Groin   Placement Date: 07/02/25   Drain Type (Required): Closed/Suction  Tube Number: 1  Location: Groin   Output (mL) 25 ml

## 2025-07-03 NOTE — OP NOTE
PREOPERATIVE DIAGNOSIS:   Scrotal hematoma  POSTOPERATIVE DIAGNOSIS:                      Same    PROCEDURES PERFORMED: Scrotal exploration with washout of hematoma    STAFF SURGEON:   Christin Vargas MD    ESTIMATED BLOOD LOSS: 50 mL.   IV FLUIDS: see dictated anesthesia record  COMPLICATIONS: None.   SPECIMEN: None  Drains: 10 Fr RON drain exiting from the right hemiscrotum    SIGNIFICANT FINDINGS: Approximately 200 mL hematoma evacuated. Unable to identify a single bleeding vessel. Areas of subtle venous bleeding cauterized - mostly on the dartos/scrotal wall on the right upper hemiscrotum.     BRIEF OPERATIVE INDICATIONS:GRACIELA Slaughter is a 34 year old transgender female POD1 from a scrotal orchiectomy for gender dysphoria. She called the clinic this AM after she had developed a large scrotal hematoma. Discussed options including conservative management vs. Operative intervention and operative intervention was chosen after discussing the risks, benefits, and alternatives.     DESCRIPTION OF PROCEDURE: After full informed voluntary consent was obtained, the patient was transported to the operating room, placed supine on the table. After adequate anesthesia was induced, they were prepped and draped in the usual sterile fashion. A timeout was taken to confirm correct patient, procedure and laterality      We began the procedure by re-incising our 3 cm midline scrotal incision. I extended this an additional 2 cm cephalad for a total of 5 cm. Immediately I was able to evacuate 200 mL old clotted blood. There was extensive blood inside the scrotal which could not be evacuated.     The scrotum was copiously irrigated with 1L NS. I then identified a few slowly oozing areas on the right scrotal wall/dartos that I fulgurated. The same was done on the left side. The cord stumps did not appear to be bleeding. I could not find a single obvious bleeding vessel.     The would was irrigated again and we could not identify any  additional bleeding areas. Floseal was applied and a 10 Fr RON drain was placed, exiting from the right hemiscrotum. This was secured with a Nylon suture.     The dartos was reapproximated with 3-0 vicryl and the skin with 4-0 Monocryl in a running horizontal mattress fashion.     The scrotum was loose at the conclusion of the case.     There were no complications.    Dispo:  - Drain will be removed when outputs are < 15 mL/day

## 2025-07-03 NOTE — ED NOTES
Bed: ED24  Expected date:   Expected time:   Means of arrival:   Comments:  429: 34F, bleeding from surgical site ETA 0216

## 2025-07-03 NOTE — PROGRESS NOTES
RECEIVING UNIT ED HANDOFF REVIEW    ED Nurse Handoff Report was reviewed by: Nadege Alfaro, RN on July 3, 2025 at 1:25 PM       Okay to send once room clean

## 2025-07-03 NOTE — ANESTHESIA POSTPROCEDURE EVALUATION
Patient: Latasha Slaughter    Procedure: Procedure(s):  Scrotal exploration, with evacuation hematoma       Anesthesia Type:  General    Note:  Disposition: Inpatient   Postop Pain Control: Uneventful            Sign Out: Well controlled pain   PONV: No   Neuro/Psych: Uneventful            Sign Out: Acceptable/Baseline neuro status   Airway/Respiratory: Uneventful            Sign Out: Acceptable/Baseline resp. status   CV/Hemodynamics: Uneventful            Sign Out: Acceptable CV status; No obvious hypovolemia; No obvious fluid overload   Other NRE: NONE   DID A NON-ROUTINE EVENT OCCUR? No       Last vitals:  Vitals Value Taken Time   /70 07/02/25 20:45   Temp 36.5  C (97.7  F) 07/02/25 20:45   Pulse 63 07/02/25 20:54   Resp 10 07/02/25 20:54   SpO2 98 % 07/02/25 20:54   Vitals shown include unfiled device data.    Electronically Signed By: Bryon Kamara MD  July 2, 2025  8:55 PM

## 2025-07-03 NOTE — PHARMACY-ADMISSION MEDICATION HISTORY
Pharmacist Admission Medication History    Admission medication history is complete. The information provided in this note is only as accurate as the sources available at the time of the update.    Information Source(s): Patient and CareEverywhere/SureScripts via in-person    Pertinent Information: Patient states she is due for her estradiol injection today 7/3 but is ok to skip it this week.     Changes made to PTA medication list:  Added: multivitamin  Deleted: spironolactone, duplicate oxycodone  Changed: None    Allergies reviewed with patient and updates made in EHR: yes    Medication History Completed By: Kay Childers RPH 7/3/2025 8:58 AM    PTA Med List   Medication Sig Last Dose/Taking    acetaminophen (TYLENOL) 325 MG tablet Take 2 tablets (650 mg) by mouth every 4 hours as needed for mild pain. Taking As Needed    busPIRone (BUSPAR) 10 MG tablet Take 1 tablet (10 mg) by mouth 2 times daily. 7/2/2025 Evening    cetirizine (ZYRTEC) 10 MG tablet Take 10 mg by mouth daily. 7/2/2025    estradiol valerate (DELESTROGEN) 20 MG/ML injection Inject 5 mg into the muscle once a week. Discard vial after 28 days after puncture  Weekly on Thursdays 6/26/2025    LamoTRIgine (LAMICTAL XR) 300 MG 24 hr tablet Take 1 tablet (300 mg) by mouth daily. 7/2/2025 Bedtime    lurasidone (LATUDA) 40 MG TABS tablet Take 1 tablet (40 mg) by mouth daily with food. 7/2/2025 Bedtime    multivitamin, therapeutic (THERA-VIT) TABS tablet Take 1 tablet by mouth daily. 7/2/2025    omeprazole (PRILOSEC) 20 MG DR capsule Take 1 capsule (20 mg) by mouth daily. 7/2/2025 Morning    oxyCODONE (ROXICODONE) 5 MG tablet Take 1 tablet (5 mg) by mouth every 6 hours as needed for pain. Taking As Needed    progesterone (PROMETRIUM) 100 MG capsule Take 1 capsule (100 mg) by mouth daily. 7/2/2025 Bedtime    propranolol (INDERAL) 10 MG tablet Take 1 tablet by mouth daily as needed. Taking As Needed    senna-docusate (SENOKOT-S/PERICOLACE) 8.6-50 MG tablet  Take 1-2 tablets by mouth 2 times daily. (Patient taking differently: Take 1-2 tablets by mouth 2 times daily as needed for constipation.) Taking Differently    SUMAtriptan (IMITREX) 50 MG tablet Take 1 tablet (50 mg) by mouth at onset of headache for migraine. May repeat in 2 hours. Max 4 tablets/24 hours. Taking As Needed

## 2025-07-05 ENCOUNTER — TELEPHONE (OUTPATIENT)
Dept: SURGERY | Facility: CLINIC | Age: 35
End: 2025-07-05
Payer: COMMERCIAL

## 2025-07-05 DIAGNOSIS — T81.40XA POSTOPERATIVE INFECTION, UNSPECIFIED TYPE, INITIAL ENCOUNTER: Primary | ICD-10-CM

## 2025-07-05 NOTE — CONFIDENTIAL NOTE
Paged by  that Latasha Slaughter called requesting to speak with urology on call.     Pt states that she is concerned about a post-op infection. She has noticed increased redness, pain, and trace white discharge from the superior aspect of her incision. Denies increased swelling, sensation of fluid collection, fevers, or chills.     I reviewed the imaging and it appears that she may have a superficial infection. I discussed with the patient that a phone conversation does not replace a physical exam and offered in-person evaluation. I ordered a course of Augmentin x 7 days to her pharmacy. I advised that it would be reasonable to observe on oral antibiotics but that she she present to an ED if she develops worsening redness, drainage, swelling or sensation of fluid collection. Discussed other strict return precautions including fevers > 101 F, an inability to keep food or fluids down, new lethargy or weakness, and pain not controlled with oral medications. She expressed understanding and would prefer to monitor at home for now.     Mary Macdonald MD  Urology Resident, PGY2

## 2025-07-07 ENCOUNTER — TELEPHONE (OUTPATIENT)
Dept: UROLOGY | Facility: CLINIC | Age: 35
End: 2025-07-07

## 2025-07-07 ENCOUNTER — OFFICE VISIT (OUTPATIENT)
Dept: UROLOGY | Facility: CLINIC | Age: 35
End: 2025-07-07
Payer: COMMERCIAL

## 2025-07-07 VITALS
DIASTOLIC BLOOD PRESSURE: 71 MMHG | BODY MASS INDEX: 32.58 KG/M2 | SYSTOLIC BLOOD PRESSURE: 135 MMHG | HEART RATE: 76 BPM | HEIGHT: 69 IN | WEIGHT: 220 LBS

## 2025-07-07 DIAGNOSIS — F64.0 GENDER DYSPHORIA IN ADULT: ICD-10-CM

## 2025-07-07 DIAGNOSIS — S30.22XA SCROTAL HEMATOMA: ICD-10-CM

## 2025-07-07 DIAGNOSIS — G89.18 ACUTE POST-OPERATIVE PAIN: Primary | ICD-10-CM

## 2025-07-07 PROCEDURE — 99024 POSTOP FOLLOW-UP VISIT: CPT | Performed by: STUDENT IN AN ORGANIZED HEALTH CARE EDUCATION/TRAINING PROGRAM

## 2025-07-07 RX ORDER — OXYCODONE HYDROCHLORIDE 5 MG/1
5 TABLET ORAL EVERY 6 HOURS PRN
Qty: 12 TABLET | Refills: 0 | Status: SHIPPED | OUTPATIENT
Start: 2025-07-07 | End: 2025-07-10

## 2025-07-07 NOTE — PROGRESS NOTES
"UROLOGY CLINIC FOLLOW-UP NOTE    Destiny Slaughter is a 34 year-old transgender female who underwent bilateral simple orchiectomy on 7/1/2025, complicated by a scrotal hematoma, s/p scrotal exploration, hematoma evacuation, and drain placement on 7/2.     She did come to the ER a few hours after her hematoma evacuation for difficulty with her drain (did not realize how to put drain to suction), but was discharged after Hb remained stable on a recheck and drain was confirmed to be functioning.     I called her on 7/4 to check in and things were improving, however she experienced an increase in pain on 7/5. She spoke to the urology resident on call who prescribed her Augmentin in the event she was experiencing an early post-op infection.     Today she reports her drain is only putting out scant amounts of serosanguinous fluid. She has pain mostly located at the posterior scrotum. No recurrence of her swelling. No incisional drainage.     /71   Pulse 76   Ht 1.753 m (5' 9\")   Wt 99.8 kg (220 lb)   BMI 32.49 kg/m    Gen: appears uncomfortable  : Scrotum with diffuse bruising, drain with scant SSD, scrotum is soft without recurrence of hematoma. No incisional drainage. Wound edges are re-approximated.    Her drain was removed today.    Assessment:  Gender dysphoria  Post-operative scrotal hematoma    Plan:  No evidence of infection on exam today. I suspect she was having significant discomfort from the drain itself that will hopefully resolve now that the drain has been removed.     I refilled a limited quantity of Oxycodone for her. Encouraged Tylenol and Ibuprofen use.     I will contact her in 2 days for a symptom check.    Wednesday 7/9 Update:   I contacted the patient who states she feels much better. She was offered a clinic visit today but declined this. States bruising is resolving.     - Follow-up as needed    Christin Vargas MD  Reconstructive Urology  [facility]    "

## 2025-07-07 NOTE — LETTER
"7/7/2025       RE: Latasha Slaughter  [address]   [address]  [place] [address]     Dear Colleague,    Thank you for referring your patient, Latasha Slaughter, to the [facility] [place] at [facility]. Please see a copy of my visit note below.    UROLOGY CLINIC FOLLOW-UP NOTE    Destiny Slaughter is a 34 year-old transgender female who underwent bilateral simple orchiectomy on 7/1/2025, complicated by a scrotal hematoma, s/p scrotal exploration, hematoma evacuation, and drain placement on 7/2.     She did come to the ER a few hours after her hematoma evacuation for difficulty with her drain (did not realize how to put drain to suction), but was discharged after Hb remained stable on a recheck and drain was confirmed to be functioning.     I called her on 7/4 to check in and things were improving, however she experienced an increase in pain on 7/5. She spoke to the urology resident on call who prescribed her Augmentin in the event she was experiencing an early post-op infection.     Today she reports her drain is only putting out scant amounts of serosanguinous fluid. She has pain mostly located at the posterior scrotum. No recurrence of her swelling. No incisional drainage.     /71   Pulse 76   Ht 1.753 m (5' 9\")   Wt 99.8 kg (220 lb)   BMI 32.49 kg/m    Gen: appears uncomfortable  : Scrotum with diffuse bruising, drain with scant SSD, scrotum is soft without recurrence of hematoma. No incisional drainage. Wound edges are re-approximated.    Her drain was removed today.    Assessment:  Gender dysphoria  Post-operative scrotal hematoma    Plan:  No evidence of infection on exam today. I suspect she was having significant discomfort from the drain itself that will hopefully resolve now that the drain has been removed.     I refilled a limited quantity of Oxycodone for her. Encouraged Tylenol and Ibuprofen use.     I will contact her in 2 days " for a symptom check.    Wednesday 7/9 Update:   I contacted the patient who states she feels much better. She was offered a clinic visit today but declined this. States bruising is resolving.     - Follow-up as needed    Christin Vargas MD  Reconstructive Urology  [facility]      Again, thank you for allowing me to participate in the care of your patient.      Sincerely,    Christin Vargas MD

## 2025-07-08 ENCOUNTER — TELEPHONE (OUTPATIENT)
Dept: PLASTIC SURGERY | Facility: CLINIC | Age: 35
End: 2025-07-08
Payer: COMMERCIAL

## 2025-07-08 NOTE — TELEPHONE ENCOUNTER
RN called to follow up DOS 7/1/25 and complications post-op    Destiny was seen in clinic yesterday by Dr Vargas and the drain is out. She reports she is still taking antibiotics and is feeling better. She reports her main symptom now is pain.  She got a refill of oxycodone yesterday and is taking that as needed for pain. She reports overall her pain is improving. She reports no new symptoms or concerns.  Norma Suresh RN on 7/8/2025 at 4:08 PM

## 2025-07-14 ENCOUNTER — TELEPHONE (OUTPATIENT)
Dept: UROLOGY | Facility: CLINIC | Age: 35
End: 2025-07-14
Payer: COMMERCIAL

## 2025-07-15 NOTE — TELEPHONE ENCOUNTER
Alfredo-    Just gave Destiny a call. Said it wasn't her who called- perhaps this was an old encounter.     Thanks!    -RM

## 2025-07-17 ENCOUNTER — VIRTUAL VISIT (OUTPATIENT)
Dept: PLASTIC SURGERY | Facility: CLINIC | Age: 35
End: 2025-07-17
Payer: COMMERCIAL

## 2025-07-17 VITALS — BODY MASS INDEX: 32.58 KG/M2 | WEIGHT: 220 LBS | HEIGHT: 69 IN

## 2025-07-17 DIAGNOSIS — Z90.79 STATUS POST ORCHIECTOMY: Primary | ICD-10-CM

## 2025-07-17 ASSESSMENT — PAIN SCALES - GENERAL: PAINLEVEL_OUTOF10: NO PAIN (0)

## 2025-07-17 NOTE — PROGRESS NOTES
"Virtual Visit Details    Originating Location (pt. Location): Home    Distant Location (provider location):  Off-site  Platform used for Video Visit: [employer]bin       Subjective:  Destiny Slaughter is a 34 year-old transgender female who underwent bilateral simple orchiectomy on 7/1/2025 with Dr Vargas, complicated by a scrotal hematoma, s/p scrotal exploration, hematoma evacuation, and drain placement on 7/2. She did receive a course of Augmentin which was given for possible early PO infection starting on 7/5. Dr Vargas saw patient in clinic on 7/7/25 for surgical follow-up and drain removal. At that time she was doing well and no recurrence of hematoma or swelling had occurred. When they spoke on 7/9, the patient was reporting improvement in overall bruising and discomfort.    She is now 16 days post-op and she reports no further residual pain at this point. The stitches are a bit uncomfortable still, but applying Vaseline does help. She is wondering if the residual swelling she has will continue to decrease or if some of it is permanent. She reports no increase and no major change in swelling since she saw Dr Vargas about 9 days ago. She just wants to make sure she will continue to see improvement.      Objective:  Ht 1.753 m (5' 9\")   Wt 99.8 kg (220 lb)   BMI 32.49 kg/m    Gen: Pleasant and in NAD  : deferred due to video format    Assessment:  S/P bilateral orchiectomy  Post-operative scrotal hematoma - s/p hematoma evacuation  Healing well with expected residual swelling    Plan:  Reassured patient that ongoing swelling is still normal and can take a couple months to fully resolve. Reassuring that pain is greatly improved and no new bruising or increase in swelling  Follow-up angélica Hernandez, APRN, CNP    Total of 15 min spent reviewing chart, evaluating and counseling patient, and completing documentation today on day of service.    "

## 2025-07-17 NOTE — NURSING NOTE
Current patient location: home    Is the patient currently in the state of [place]? YES    Visit mode: VIDEO    If the visit is dropped, the patient can be reconnected by:VIDEO VISIT: Text to cell phone:   Telephone Information:   Mobile [phone]       Will anyone else be joining the visit? NO  (If patient encounters technical issues they should call [phone])    Are changes needed to the allergy or medication list? Pt stated no changes to allergies and Pt stated no med changes    Are refills needed on medications prescribed by this physician? NO    Rooming Documentation:  Questionnaire(s) completed      Reason for visit: RECHECK    Wt other than 24 hrs:    Pain more than one location:  no  Yoselyn JACKSON

## 2025-07-17 NOTE — LETTER
"7/17/2025       RE: Latasha Slaughter  [address]   [address]  [place] [address]     Dear Colleague,    Thank you for referring your patient, Latasha Slaughter, to the [facility] [place] at [facility]. Please see a copy of my visit note below.    Virtual Visit Details    Originating Location (pt. Location): Home    Distant Location (provider location):  Off-site  Platform used for Video Visit: ControlScan       Subjective:  Destiny Slaughter is a 34 year-old transgender female who underwent bilateral simple orchiectomy on 7/1/2025 with Dr Vargas, complicated by a scrotal hematoma, s/p scrotal exploration, hematoma evacuation, and drain placement on 7/2. She did receive a course of Augmentin which was given for possible early PO infection starting on 7/5. Dr Vargas saw patient in clinic on 7/7/25 for surgical follow-up and drain removal. At that time she was doing well and no recurrence of hematoma or swelling had occurred. When they spoke on 7/9, the patient was reporting improvement in overall bruising and discomfort.    She is now 16 days post-op and she reports no further residual pain at this point. The stitches are a bit uncomfortable still, but applying Vaseline does help. She is wondering if the residual swelling she has will continue to decrease or if some of it is permanent. She reports no increase and no major change in swelling since she saw Dr Vargas about 9 days ago. She just wants to make sure she will continue to see improvement.      Objective:  Ht 1.753 m (5' 9\")   Wt 99.8 kg (220 lb)   BMI 32.49 kg/m    Gen: Pleasant and in NAD  : deferred due to video format    Assessment:  S/P bilateral orchiectomy  Post-operative scrotal hematoma - s/p hematoma evacuation  Healing well with expected residual swelling    Plan:  Reassured patient that ongoing swelling is still normal and can take a couple months to fully " resolve. Reassuring that pain is greatly improved and no new bruising or increase in swelling  Follow-up prn      TOD Guido, CNP    Total of 15 min spent reviewing chart, evaluating and counseling patient, and completing documentation today on day of service.      Again, thank you for allowing me to participate in the care of your patient.      Sincerely,    TOD Elias CNP

## 2025-07-23 DIAGNOSIS — F64.9 GENDER DYSPHORIA: ICD-10-CM

## 2025-07-24 RX ORDER — PROGESTERONE 100 MG/1
100 CAPSULE ORAL DAILY
Qty: 30 CAPSULE | Refills: 1 | Status: SHIPPED | OUTPATIENT
Start: 2025-07-24

## 2025-07-24 RX ORDER — ESTRADIOL VALERATE 20 MG/ML
INJECTION INTRAMUSCULAR
Qty: 5 ML | Refills: 1 | Status: SHIPPED | OUTPATIENT
Start: 2025-07-24

## 2025-08-08 DIAGNOSIS — G43.009 MIGRAINE WITHOUT AURA AND WITHOUT STATUS MIGRAINOSUS, NOT INTRACTABLE: ICD-10-CM

## 2025-08-12 RX ORDER — SUMATRIPTAN 50 MG/1
50 TABLET, FILM COATED ORAL
Qty: 30 TABLET | Refills: 0 | Status: SHIPPED | OUTPATIENT
Start: 2025-08-12

## 2025-08-14 ASSESSMENT — PATIENT HEALTH QUESTIONNAIRE - PHQ9: SUM OF ALL RESPONSES TO PHQ QUESTIONS 1-9: 9

## 2025-08-15 ENCOUNTER — OFFICE VISIT (OUTPATIENT)
Dept: FAMILY MEDICINE | Facility: CLINIC | Age: 35
End: 2025-08-15
Payer: COMMERCIAL

## 2025-08-15 VITALS
HEART RATE: 71 BPM | BODY MASS INDEX: 34.13 KG/M2 | OXYGEN SATURATION: 98 % | TEMPERATURE: 98 F | DIASTOLIC BLOOD PRESSURE: 82 MMHG | WEIGHT: 230.4 LBS | HEIGHT: 69 IN | SYSTOLIC BLOOD PRESSURE: 125 MMHG | RESPIRATION RATE: 14 BRPM

## 2025-08-15 DIAGNOSIS — Z01.818 PREOP GENERAL PHYSICAL EXAM: Primary | ICD-10-CM

## 2025-08-15 PROBLEM — F33.1 MODERATE EPISODE OF RECURRENT MAJOR DEPRESSIVE DISORDER (H): Status: ACTIVE | Noted: 2025-03-24

## 2025-08-15 PROBLEM — R58 BLEEDING: Status: RESOLVED | Noted: 2025-07-03 | Resolved: 2025-08-15

## 2025-08-15 PROBLEM — Z48.89 ENCOUNTER FOR POSTOPERATIVE WOUND CHECK: Status: RESOLVED | Noted: 2025-07-03 | Resolved: 2025-08-15

## 2025-08-15 ASSESSMENT — PATIENT HEALTH QUESTIONNAIRE - PHQ9
SUM OF ALL RESPONSES TO PHQ QUESTIONS 1-9: 9
10. IF YOU CHECKED OFF ANY PROBLEMS, HOW DIFFICULT HAVE THESE PROBLEMS MADE IT FOR YOU TO DO YOUR WORK, TAKE CARE OF THINGS AT HOME, OR GET ALONG WITH OTHER PEOPLE: VERY DIFFICULT

## 2025-08-18 ENCOUNTER — RESULTS FOLLOW-UP (OUTPATIENT)
Dept: FAMILY MEDICINE | Facility: CLINIC | Age: 35
End: 2025-08-18
Payer: COMMERCIAL

## 2025-08-21 DIAGNOSIS — F41.9 ANXIETY: ICD-10-CM

## 2025-08-23 RX ORDER — BUSPIRONE HYDROCHLORIDE 10 MG/1
10 TABLET ORAL 2 TIMES DAILY
Qty: 180 TABLET | Refills: 0 | Status: SHIPPED | OUTPATIENT
Start: 2025-08-23

## 2025-08-23 RX ORDER — BUSPIRONE HYDROCHLORIDE 10 MG/1
10 TABLET ORAL 2 TIMES DAILY
Qty: 180 TABLET | Refills: 0 | Status: SHIPPED | OUTPATIENT
Start: 2025-08-23 | End: 2025-08-23

## (undated) DEVICE — DRAIN JACKSON PRATT 10FR ROUND SU130-1321

## (undated) DEVICE — LINEN TOWEL PACK X5 5464

## (undated) DEVICE — KIT TURNOVER FAIRVIEW SOUTHDALE FULL SP3889

## (undated) DEVICE — ESU GROUND PAD ADULT W/CORD E7507

## (undated) DEVICE — GLOVE PROTEXIS BLUE W/NEU-THERA 7.0  2D73EB70

## (undated) DEVICE — RX SURGIFLO HEMOSTATIC MATRIX W/THROMBIN 8ML 2994

## (undated) DEVICE — DRAIN RESERVOIR 100ML JP 0070740

## (undated) DEVICE — RX BACITRACIN OINTMENT 14G 0.5OZ 45802-060-01

## (undated) DEVICE — PACK MINOR SBA15MIFSE

## (undated) DEVICE — GLOVE PROTEXIS BLUE W/NEU-THERA 6.5  2D73EB65

## (undated) DEVICE — PACK MINOR CUSTOM ASC

## (undated) DEVICE — SU MONOCRYL 4-0 PS-2 27" UND Y426H

## (undated) DEVICE — PAD CHUX UNDERPAD 30X36" P3036C

## (undated) DEVICE — SUCTION MANIFOLD NEPTUNE 2 SYS 4 PORT 0702-020-000

## (undated) DEVICE — SU SILK 2-0 SH 30" K833H

## (undated) DEVICE — PANTIES MESH LG/XLG 2PK 706M2

## (undated) DEVICE — Device

## (undated) DEVICE — BLADE KNIFE SURG 15 371115

## (undated) DEVICE — TRAY PREP DRY SKIN SCRUB 067

## (undated) DEVICE — DRAPE LAP W/ARMBOARD 29410

## (undated) DEVICE — PREP CHLORAPREP 26ML TINTED HI-LITE ORANGE 930815

## (undated) DEVICE — SU MONOCRYL 4-0 P-3 18" UND Y494G

## (undated) DEVICE — SOL NACL 0.9% IRRIG 500ML BOTTLE 2F7123

## (undated) DEVICE — SU VICRYL 2-0 SH 27" J317H

## (undated) DEVICE — DRSG KERLIX FLUFFS X5

## (undated) DEVICE — DRAPE POUCH INSTRUMENT 1018

## (undated) DEVICE — SYR 10ML FINGER CONTROL W/O NDL 309695

## (undated) DEVICE — SUCTION MANIFOLD NEPTUNE 2 SYS 1 PORT 702-025-000

## (undated) DEVICE — SU VICRYL+ 3-0 27IN SH UND VCP416H

## (undated) DEVICE — TUBING SUCTION MEDI-VAC SOFT 3/16"X20' N520A

## (undated) DEVICE — SU SILK 0 SH 30" K834H

## (undated) DEVICE — BLADE CLIPPER DISP 4406

## (undated) DEVICE — ESU PENCIL SMOKE EVAC W/ROCKER SWITCH 0703-047-000

## (undated) RX ORDER — OXYCODONE HYDROCHLORIDE 5 MG/1
TABLET ORAL
Status: DISPENSED
Start: 2025-07-02

## (undated) RX ORDER — ACETAMINOPHEN 325 MG/1
TABLET ORAL
Status: DISPENSED
Start: 2025-07-02

## (undated) RX ORDER — BUPIVACAINE HYDROCHLORIDE 2.5 MG/ML
INJECTION, SOLUTION EPIDURAL; INFILTRATION; INTRACAUDAL; PERINEURAL
Status: DISPENSED
Start: 2025-07-02

## (undated) RX ORDER — PROPOFOL 10 MG/ML
INJECTION, EMULSION INTRAVENOUS
Status: DISPENSED
Start: 2025-07-02

## (undated) RX ORDER — FENTANYL CITRATE 50 UG/ML
INJECTION, SOLUTION INTRAMUSCULAR; INTRAVENOUS
Status: DISPENSED
Start: 2025-07-02

## (undated) RX ORDER — FENTANYL CITRATE 0.05 MG/ML
INJECTION, SOLUTION INTRAMUSCULAR; INTRAVENOUS
Status: DISPENSED
Start: 2025-07-02

## (undated) RX ORDER — ONDANSETRON 2 MG/ML
INJECTION INTRAMUSCULAR; INTRAVENOUS
Status: DISPENSED
Start: 2025-07-01

## (undated) RX ORDER — HYDROMORPHONE HCL IN WATER/PF 6 MG/30 ML
PATIENT CONTROLLED ANALGESIA SYRINGE INTRAVENOUS
Status: DISPENSED
Start: 2025-07-02

## (undated) RX ORDER — DEXAMETHASONE SODIUM PHOSPHATE 4 MG/ML
INJECTION, SOLUTION INTRA-ARTICULAR; INTRALESIONAL; INTRAMUSCULAR; INTRAVENOUS; SOFT TISSUE
Status: DISPENSED
Start: 2025-07-01

## (undated) RX ORDER — FENTANYL CITRATE 50 UG/ML
INJECTION, SOLUTION INTRAMUSCULAR; INTRAVENOUS
Status: DISPENSED
Start: 2025-07-01

## (undated) RX ORDER — ACETAMINOPHEN 325 MG/1
TABLET ORAL
Status: DISPENSED
Start: 2025-07-01

## (undated) RX ORDER — HYDROMORPHONE HYDROCHLORIDE 1 MG/ML
INJECTION, SOLUTION INTRAMUSCULAR; INTRAVENOUS; SUBCUTANEOUS
Status: DISPENSED
Start: 2025-07-02

## (undated) RX ORDER — KETOROLAC TROMETHAMINE 30 MG/ML
INJECTION, SOLUTION INTRAMUSCULAR; INTRAVENOUS
Status: DISPENSED
Start: 2025-07-01

## (undated) RX ORDER — GINSENG 100 MG
CAPSULE ORAL
Status: DISPENSED
Start: 2025-07-02

## (undated) RX ORDER — PROPOFOL 10 MG/ML
INJECTION, EMULSION INTRAVENOUS
Status: DISPENSED
Start: 2025-07-01

## (undated) RX ORDER — DIPHENHYDRAMINE HYDROCHLORIDE 50 MG/ML
INJECTION, SOLUTION INTRAMUSCULAR; INTRAVENOUS
Status: DISPENSED
Start: 2025-07-02